# Patient Record
Sex: FEMALE | Race: WHITE | Employment: FULL TIME | URBAN - METROPOLITAN AREA
[De-identification: names, ages, dates, MRNs, and addresses within clinical notes are randomized per-mention and may not be internally consistent; named-entity substitution may affect disease eponyms.]

---

## 2019-09-03 ENCOUNTER — OFFICE VISIT (OUTPATIENT)
Dept: PRIMARY CARE CLINIC | Age: 29
End: 2019-09-03

## 2019-09-03 VITALS
HEART RATE: 89 BPM | RESPIRATION RATE: 16 BRPM | OXYGEN SATURATION: 99 % | SYSTOLIC BLOOD PRESSURE: 123 MMHG | BODY MASS INDEX: 21.73 KG/M2 | TEMPERATURE: 98.5 F | WEIGHT: 130.4 LBS | DIASTOLIC BLOOD PRESSURE: 88 MMHG | HEIGHT: 65 IN

## 2019-09-03 DIAGNOSIS — R79.89 LOW VITAMIN D LEVEL: ICD-10-CM

## 2019-09-03 DIAGNOSIS — Z01.89 ROUTINE LAB DRAW: ICD-10-CM

## 2019-09-03 DIAGNOSIS — Z91.018 MULTIPLE FOOD ALLERGIES: ICD-10-CM

## 2019-09-03 DIAGNOSIS — F32.A ANXIETY AND DEPRESSION: Primary | ICD-10-CM

## 2019-09-03 DIAGNOSIS — Z76.89 ENCOUNTER TO ESTABLISH CARE: ICD-10-CM

## 2019-09-03 DIAGNOSIS — R10.9 STOMACH PAIN: ICD-10-CM

## 2019-09-03 DIAGNOSIS — F98.8 ATTENTION DEFICIT DISORDER, UNSPECIFIED HYPERACTIVITY PRESENCE: ICD-10-CM

## 2019-09-03 DIAGNOSIS — F41.9 ANXIETY AND DEPRESSION: Primary | ICD-10-CM

## 2019-09-03 RX ORDER — LEVONORGESTREL AND ETHINYL ESTRADIOL 0.1-0.02MG
KIT ORAL
COMMUNITY
Start: 2019-08-17

## 2019-09-03 NOTE — PROGRESS NOTES
Chief Complaint   Patient presents with   [address]     patient is establishing care and having problems with abdominal pain with diarhea.  states that she has had this problem her whole life and it is getting worse over the last two years. states that she was seeing a psychiatrist at [facility] but had to stop seeing when she graduated. states that she has been trying to find one but is having difficulty.

## 2019-09-03 NOTE — PROGRESS NOTES
[facility]   Michael Lay 64[address], [address] [facility]  P: [phone]  F: [phone]      Chief Complaint   Patient presents with   [facility]     patient is establishing care and having problems with abdominal pain with diarhea.  states that she has had this problem her whole life and it is getting worse over the last two years. states that she was seeing a psychiatrist at [facility] but had to stop seeing when she graduated. states that she has been trying to find one but is having difficulty. SUBJECTIVE   Roro Donovan is a 26 y.o. female who presents to clinic for Establish Care (patient is establishing care and having problems with abdominal pain with diarhea.  states that she has had this problem her whole life and it is getting worse over the last two years. states that she was seeing a psychiatrist at [facility] but had to stop seeing when she graduated. states that she has been trying to find one but is having difficulty. ). HPI:    Eugene Roesnberg is a 27-year-old female who presents today to establish care, and for acute onset but chronic issue of epigastric stomach pain. She took Zantac and Pepto last night with some relief in pain. She denies any changes to her stool or bladder patterns. She denies any dark or bright red blood stools. She states she has multiple food allergies, but denies prior diagnosis of Crohn's or IBS. She is also requesting medication refill for anxiety, depression, and ADD. She was previously followed by a psychiatrist Capo Montejo at [facility]. She was on Effexor and Adderall but stopped medication in April due to losing health insurance. She is on sure of her prior dosing of both medications. She is currently seeing a counselor through [address] [facility]. She has her masters and computer science. There are no active problems to display for this patient.     Past Medical History:   Diagnosis Date    Depression      Past Surgical History:   Procedure Laterality Date    HX APPENDECTOMY       Social History     Socioeconomic History    Marital status: SINGLE     Spouse name: Not on file    Number of children: Not on file    Years of education: Not on file    Highest education level: Not on file   Occupational History    Not on file   Social Needs    Financial resource strain: Not on file    Food insecurity:     Worry: Not on file     Inability: Not on file    Transportation needs:     Medical: Not on file     Non-medical: Not on file   Tobacco Use    Smoking status: Never Smoker    Smokeless tobacco: Never Used   Substance and Sexual Activity    Alcohol use: Yes     Comment: rarely    Drug use: Never    Sexual activity: Yes     Partners: Female     Birth control/protection: None   Lifestyle    Physical activity:     Days per week: Not on file     Minutes per session: Not on file    Stress: Not on file   Relationships    Social connections:     Talks on phone: Not on file     Gets together: Not on file     Attends Mandaen service: Not on file     Active member of club or organization: Not on file     Attends meetings of clubs or organizations: Not on file     Relationship status: Not on file    Intimate partner violence:     Fear of current or ex partner: Not on file     Emotionally abused: Not on file     Physically abused: Not on file     Forced sexual activity: Not on file   Other Topics Concern    Not on file   Social History Narrative    Not on file     No family history on file. Allergies   Allergen Reactions    Sulfa (Sulfonamide Antibiotics) Rash       Current Outpatient Medications   Medication Sig Dispense Refill    VIENVA 0.1-20 mg-mcg tab       Cetirizine (ZYRTEC) 10 mg cap Take  by mouth. The medications were reviewed and updated in the medical record. The past medical history, past surgical history, and family history were reviewed and updated in the medical record.     REVIEW OF SYSTEMS Review of Systems   Constitutional: Negative for malaise/fatigue. HENT: Negative for congestion. Eyes: Negative for blurred vision and pain. Respiratory: Negative for cough and shortness of breath. Cardiovascular: Negative for chest pain and palpitations. Gastrointestinal: Negative for abdominal pain and heartburn. Genitourinary: Negative for frequency and urgency. Musculoskeletal: Negative for joint pain and myalgias. Neurological: Negative for dizziness, tingling, sensory change, weakness and headaches. Psychiatric/Behavioral: Negative for depression, memory loss and substance abuse. PHYSICAL EXAM     Visit Vitals  /88 (BP 1 Location: Left arm, BP Patient Position: Sitting)   Pulse 89   Temp 98.5 °F (36.9 °C) (Oral)   Resp 16   Ht 5' 5\" (1.651 m)   Wt 130 lb 6.4 oz (59.1 kg)   LMP 08/20/2019   SpO2 99%   BMI 21.70 kg/m²       Physical Exam   Constitutional: She is oriented to person, place, and time and well-developed, well-nourished, and in no distress. HENT:   Head: Normocephalic and atraumatic. Right Ear: External ear normal.   Left Ear: External ear normal.   Cardiovascular: Normal rate, regular rhythm and normal heart sounds. Pulmonary/Chest: Effort normal and breath sounds normal.   Musculoskeletal: Normal range of motion. She exhibits no edema. Neurological: She is alert and oriented to person, place, and time. Gait normal.   Skin: Skin is warm and dry. Psychiatric: Affect and judgment normal. Her mood appears anxious. She expresses no homicidal and no suicidal ideation. Nursing note and vitals reviewed. ASSESSMENT/ PLAN   Diagnoses and all orders for this visit:    1. Anxiety and depression      -Provided multiple resources today for psychiatry, has a current counselor with [facility].  -Encouraged her to send us her records from [facility], and I can restart her Effexor.     2. Attention deficit disorder, unspecified hyperactivity presence    -Patient will send us her records for ADD, and will return to office to discuss refilling. Discussed a controlled substance agreement and urine compliance screening, both of which patient agrees to.    3. Stomach pain     -Trial famotidine/Pepcid 20 mg daily for 2 weeks    4. Multiple food allergies    -Keep \"food \"journal and return to office to discuss. Has completed allergy testing previously. 5. Encounter to establish care    6. Routine lab draw  -     CBC W/O DIFF  -     METABOLIC PANEL, COMPREHENSIVE  -     LIPID PANEL  -     VITAMIN D, 25 HYDROXY  -     TSH 3RD GENERATION    Disclaimer:  Advised patient to call back or return to office if symptoms worsen/change/persist.  Discussed expected course/resolution/complications of diagnosis in detail with patient.     Medication risks/benefits/alternatives discussed with patient. Patient was given an after visit summary which includes diagnoses, current medications, & vitals.      Discussed patient instructions and advised to read to all patient instructions regarding care.      Patient expressed understanding with the diagnosis and plan. This note will not be viewable in [address]         Kayli Gonzalez NP  9/3/2019        (This document has been electronically signed)

## 2019-09-03 NOTE — PATIENT INSTRUCTIONS
Keep a \"food\" journal.     Obtain records from [facility]    Work on psychiatry appointment.

## 2019-09-11 ENCOUNTER — DOCUMENTATION ONLY (OUTPATIENT)
Dept: PRIMARY CARE CLINIC | Age: 29
End: 2019-09-11

## 2019-09-16 ENCOUNTER — OFFICE VISIT (OUTPATIENT)
Dept: PRIMARY CARE CLINIC | Age: 29
End: 2019-09-16

## 2019-09-16 ENCOUNTER — TELEPHONE (OUTPATIENT)
Dept: PRIMARY CARE CLINIC | Age: 29
End: 2019-09-16

## 2019-09-16 VITALS
RESPIRATION RATE: 16 BRPM | HEIGHT: 65 IN | BODY MASS INDEX: 22.23 KG/M2 | SYSTOLIC BLOOD PRESSURE: 104 MMHG | WEIGHT: 133.4 LBS | OXYGEN SATURATION: 99 % | TEMPERATURE: 98.2 F | HEART RATE: 79 BPM | DIASTOLIC BLOOD PRESSURE: 70 MMHG

## 2019-09-16 DIAGNOSIS — Z79.899 CONTROLLED SUBSTANCE AGREEMENT SIGNED: ICD-10-CM

## 2019-09-16 DIAGNOSIS — R10.32 LEFT LOWER QUADRANT PAIN: ICD-10-CM

## 2019-09-16 DIAGNOSIS — F32.A ANXIETY AND DEPRESSION: ICD-10-CM

## 2019-09-16 DIAGNOSIS — F98.8 ATTENTION DEFICIT DISORDER, UNSPECIFIED HYPERACTIVITY PRESENCE: Primary | ICD-10-CM

## 2019-09-16 DIAGNOSIS — R19.7 DIARRHEA, UNSPECIFIED TYPE: ICD-10-CM

## 2019-09-16 DIAGNOSIS — F41.9 ANXIETY AND DEPRESSION: ICD-10-CM

## 2019-09-16 RX ORDER — VENLAFAXINE HYDROCHLORIDE 37.5 MG/1
CAPSULE, EXTENDED RELEASE ORAL
Qty: 60 CAP | Refills: 2 | Status: SHIPPED | OUTPATIENT
Start: 2019-09-16 | End: 2019-12-09 | Stop reason: DRUGHIGH

## 2019-09-16 RX ORDER — DEXTROAMPHETAMINE SACCHARATE, AMPHETAMINE ASPARTATE MONOHYDRATE, DEXTROAMPHETAMINE SULFATE AND AMPHETAMINE SULFATE 3.75; 3.75; 3.75; 3.75 MG/1; MG/1; MG/1; MG/1
15 CAPSULE, EXTENDED RELEASE ORAL
Qty: 30 CAP | Refills: 0 | Status: SHIPPED | OUTPATIENT
Start: 2019-11-16 | End: 2020-04-14 | Stop reason: ALTCHOICE

## 2019-09-16 RX ORDER — DEXTROAMPHETAMINE SACCHARATE, AMPHETAMINE ASPARTATE MONOHYDRATE, DEXTROAMPHETAMINE SULFATE AND AMPHETAMINE SULFATE 3.75; 3.75; 3.75; 3.75 MG/1; MG/1; MG/1; MG/1
15 CAPSULE, EXTENDED RELEASE ORAL
Qty: 30 CAP | Refills: 0 | Status: SHIPPED | OUTPATIENT
Start: 2019-10-16 | End: 2020-04-14 | Stop reason: ALTCHOICE

## 2019-09-16 RX ORDER — FAMOTIDINE 20 MG/1
20 TABLET, FILM COATED ORAL 2 TIMES DAILY
COMMUNITY

## 2019-09-16 RX ORDER — DEXTROAMPHETAMINE SACCHARATE, AMPHETAMINE ASPARTATE MONOHYDRATE, DEXTROAMPHETAMINE SULFATE AND AMPHETAMINE SULFATE 3.75; 3.75; 3.75; 3.75 MG/1; MG/1; MG/1; MG/1
15 CAPSULE, EXTENDED RELEASE ORAL
Qty: 30 CAP | Refills: 0 | Status: SHIPPED | OUTPATIENT
Start: 2019-09-16 | End: 2019-12-09 | Stop reason: SDUPTHER

## 2019-09-16 NOTE — PROGRESS NOTES
Chief Complaint   Patient presents with    Follow-up     on stomach pain, medications and adhd     1. Have you been to an emergency room, urgent clinic, or hospitalized since your last visit? NO  If yes, where when, and reason for visit? 2. Have seen or consulted any other health care provider since your last visit? NO  Please include any pap smears or colon screening in this section  If yes, where when, and reason for visit? 3. Have you had any blood work or xray, including a mammogram since your last visit NO  If yes, where when, and reason for visit? 4. Are there any changes in medications including immunizations since your last visit? NO  If yes, where when, and reason for visit? 5. Would you like to speak with a health care team member about safety in your home? NO    6. Do you have an Advanced Directive/ Living Will in place?  NO  If yes, do we have a copy on file NO  If no, would you like information NO

## 2019-09-16 NOTE — LETTER
AGREEMENT for controlled medication treatment Jenn Read, have agreed to a course of treatment that includes taking controlled medication. For this treatment I designate _____________________________________ as the [facility] Provider.  The purpose of this agreement is to prevent misunderstandings about controlled medications I may be taking for treatment, and to comply with applicable laws. I understand this agreement is essential to the trust and confidence necessary in a provider-patient relationship and that the designated provider will treat me in accordance with the statements below. As part of my treatment I agree to the followin.  USE 
a. I will take the controlled medication as instructed by the designated provider and avoid improper use of controlled medications. b.   I will not share, sell or trade controlled medication with anyone as this is a criminal offense.  
c.   I will not use any illegal controlled substance, including marijuana, cocaine, etc. as this is a criminal offense. 2.  PROVIDERS 
a. I will only obtain controlled medication from the designated provider. b.   I will not attempt to obtain the same or similar controlled medications, such as opioid pain medication, stimulants, anti-anxiety or hypnotics from any other provider as this is a criminal offense. 
c.   I will inform the designated provider about all other licensed professionals providing medical care to me and authorize communication between all providers to coordinate care, particularly prescribing or dispensing of controlled medications. 3.  PHARMACY 
a.   I will only fill controlled medication prescriptions at the approved pharmacy as listed below. 4.  OFFICE VISITS 
a. I agree to attend scheduled office visit appointments. b.   I am aware my office visits may be monthly or as determined necessary by the designated provider. c.   I will communicate fully with the designated provider about the character and intensity of my symptoms, the effect of the symptoms on my daily life, and how well the controlled medication is helping to relieve the cause of my symptoms. 5.  REFILLS OR CALL-IN PRESCRIPTIONS OF CONTROLLED MEDICATIONS 
a. I agree that refills of my prescriptions for controlled medications will be made at the time of an office visit during regular office hours. No refills will be available during evenings or on weekends. Abusive or inappropriate behavior related to medication refills will not be tolerated. b.   I will not call the office repeatedly to inquire about my controlled medication. I understand that medications will be written on the due date and not before. Calling the office repeatedly will be considered harassment, and I may be discharged from the practice. c.   Controlled medications may not be called in for refill, but doing so is at the discretion of the designated provider. d.   I am aware that only I must  prescriptions for controlled medications at the office but the designated provider may allow a designee to  the prescription from the office under very specific circumstance that may develop. 6.  TOXICOLOGY SCREENING 
a. I agree to random urine toxicology screenings in order to comply with government and [address] [place] [address] regulations. I understand that I will be financially responsible for any charges incurred for the urine toxicology screening, which may not be covered by my insurance. Failure to do so will be considered non-compliance and I may be discharged. b. In some cases an oral swab or hair sample may be substituted for a urine screen. This is at the discretion of the designated provider.   I understand that I will be financially responsible for any charges incurred as well. 
c.   I understand that if the urine toxicology does not show my medications prescribed to me by the designated provider, or it shows any illegal substance or any other medications NOT prescribed by the designated providers, I may be discharged from this practice at the discretion of the designated provider and no further controlled medications will be prescribed or follow-up appointments scheduled. Also, if any illegal substances are detected on the urine toxicology, this information may be provided to local law enforcement. 7. PILL COUNTS 
a. I am aware I may be called at any time to come into the office for a count of all my remaining controlled medications in order to help the designated provider understand the rate at which I use my controlled medications and to more effectively adjust dosage. b. I agree that I will use my medication at a rate NO greater than the prescribed rate and that use of my medication at a greater rate will result in my being without medication for the period of time until next expected due date. c. I will bring in the containers with the medication prescribed by all providers, including the designated provider, to each office visit even if there is no medication remaining. All controlled medication will be in the original containers from the pharmacy for each medication. Failure to do so will be considered non-compliance and I may be discharged from the practice. 8.  LOSS OR THEFT OF MEDICATION 
a. I will safeguard my controlled medication from loss or theft. b.   Lost or stolen controlled medication may not be replaced. This includes a prescription that has not yet been filled at the pharmacy. c.   In the event my controlled medications are stolen or lost, I will notify the designated providers office immediately. If such event occurs during the night, weekend or holiday, I will leave a detailed message on the answering machine or answering service at the number listed above. d.   I will file and produce an official police report for any effort to replace controlled medications prescribed. 9.  AGENCY COLLABORATION I authorize the designated provider and the authorized pharmacy/pharmacist to cooperate fully with any city, state, or federal law enforcement agency in the investigation of any possible misuse, sale or other diversion of my controlled medication. 10.  TREATMENT I understand that if I violate any of the conditions, my controlled medication and/or treatment will be terminated. If the violation involves obtaining controlled substances and/or dangerous drugs from another source, the incident may be reported to other medical facilities and authorities, including law enforcement. In this case, the designated provider may taper off the medication over a period of several days, as necessary, to avoid withdrawal symptoms or will suggest alternate treatment facilities. Also, a drug dependence treatment program may be recommended. 11.  AGREEMENT I agree to follow these guidelines that have been fully explained to me. All of my questions and concerns regarding treatment have been adequately answered. A copy of this document has been given to me. I agree to use ______________________________ Authorized Pharmacy located at _________________________________________________________________ Telephone ________________________________for filling ALL controlled medication prescriptions. This agreement is entered into on 9/16/2019 Patient signature__________________________________________________________ Legal Guardian signature___________________________________________________ Provider signature_________________________________________________________ Witness signature_________________________________________________________

## 2019-09-16 NOTE — PROGRESS NOTES
[facility]   Michael Lay 64[address], [address] [facility]  P: [phone]  F: [phone]      Chief Complaint   Patient presents with    Follow-up     on stomach pain, medications and adhd       SUBJECTIVE   Estela Caldwell is a 26 y.o. female who presents to clinic for Follow-up (on stomach pain, medications and adhd). HPI:    Olu is a 78-year-old female who presents today for follow-up on medication refill for ADHD and anxiety and depression. She provided paperwork from Dr. Smiley Franz at [facility] which confirmed her diagnosis of ADHD predominantly inattentive type, anxiety disorder unspecified, and other depressive episodes. At  [facility] she was being prescribed Adderall 30 mg take 0.5 tablets twice a day, and venlafaxine ER 75 mg capsule once daily. It was recommended she continue to seek counseling through the [facility]. She states today that that regimen was working well for her ADHD and anxiety, and she has tried multiple different medications before arriving on Effexor and Adderall. She feels like the 15 mg Adderall was enough for her, she often did not take her second dose of the day. She denies SI or HI today. She continues to work as a [employer] company. Today, Olu presents with continued mild/intermittent left lower abdominal pain. She denies any fevers, and states she has had diarrhea for about 1 year occurring 1-2 times weekly. She denies any black or bright red blood stools. She did see her GYN last week and was told she does have ovarian cyst.  She is more worried that something is going on with her stomach, she continues to have diarrhea. She trialed Pepcid 20 mg after her last visit with me, but states things did not improve. She is requesting referral to GI today to be evaluated. There are no active problems to display for this patient.     Past Medical History:   Diagnosis Date    Depression      Past Surgical History:   Procedure Laterality Date    HX APPENDECTOMY       Social History     Socioeconomic History    Marital status: SINGLE     Spouse name: Not on file    Number of children: Not on file    Years of education: Not on file    Highest education level: Not on file   Occupational History    Not on file   Social Needs    Financial resource strain: Not on file    Food insecurity:     Worry: Not on file     Inability: Not on file    Transportation needs:     Medical: Not on file     Non-medical: Not on file   Tobacco Use    Smoking status: Never Smoker    Smokeless tobacco: Never Used   Substance and Sexual Activity    Alcohol use: Yes     Comment: rarely    Drug use: Never    Sexual activity: Yes     Partners: Female     Birth control/protection: None   Lifestyle    Physical activity:     Days per week: Not on file     Minutes per session: Not on file    Stress: Not on file   Relationships    Social connections:     Talks on phone: Not on file     Gets together: Not on file     Attends Gnosticism service: Not on file     Active member of club or organization: Not on file     Attends meetings of clubs or organizations: Not on file     Relationship status: Not on file    Intimate partner violence:     Fear of current or ex partner: Not on file     Emotionally abused: Not on file     Physically abused: Not on file     Forced sexual activity: Not on file   Other Topics Concern    Not on file   Social History Narrative    Not on file     History reviewed. No pertinent family history. Allergies   Allergen Reactions    Sulfa (Sulfonamide Antibiotics) Rash       Current Outpatient Medications   Medication Sig Dispense Refill    famotidine (PEPCID) 20 mg tablet Take 20 mg by mouth two (2) times a day.  amphetamine-dextroamphetamine XR (ADDERALL XR) 15 mg XR capsule Take 1 Cap by mouth every morning. Max Daily Amount: 15 mg. 30 Cap 0    venlafaxine-SR (EFFEXOR-XR) 37.5 mg capsule Take 1 tab for 1 week.  Take 2 pills after week 2. 60 Cap 2    [START ON 10/16/2019] amphetamine-dextroamphetamine XR (ADDERALL XR) 15 mg XR capsule Take 1 Cap by mouth every morning. Max Daily Amount: 15 mg. 30 Cap 0    [START ON 11/16/2019] amphetamine-dextroamphetamine XR (ADDERALL XR) 15 mg XR capsule Take 1 Cap by mouth every morning. Max Daily Amount: 15 mg. 30 Cap 0    VIENVA 0.1-20 mg-mcg tab       Cetirizine (ZYRTEC) 10 mg cap Take  by mouth. The medications were reviewed and updated in the medical record. The past medical history, past surgical history, and family history were reviewed and updated in the medical record. REVIEW OF SYSTEMS   Review of Systems   Constitutional: Negative for malaise/fatigue. HENT: Negative for congestion. Eyes: Negative for blurred vision and pain. Respiratory: Negative for cough and shortness of breath. Cardiovascular: Negative for chest pain and palpitations. Gastrointestinal: Positive for abdominal pain and diarrhea. Negative for blood in stool, constipation, heartburn, nausea and vomiting. Genitourinary: Negative for frequency and urgency. Musculoskeletal: Negative for joint pain and myalgias. Neurological: Negative for dizziness, tingling, sensory change, weakness and headaches. Psychiatric/Behavioral: Negative for depression, memory loss and substance abuse. PHYSICAL EXAM     Visit Vitals  /70 (BP 1 Location: Left arm, BP Patient Position: Sitting)   Pulse 79   Temp 98.2 °F (36.8 °C) (Oral)   Resp 16   Ht 5' 5\" (1.651 m)   Wt 133 lb 6.4 oz (60.5 kg)   LMP 08/20/2019   SpO2 99%   BMI 22.20 kg/m²       Physical Exam   Constitutional: She is oriented to person, place, and time and well-developed, well-nourished, and in no distress. HENT:   Head: Normocephalic and atraumatic. Right Ear: External ear normal.   Left Ear: External ear normal.   Cardiovascular: Normal rate, regular rhythm and normal heart sounds.    Pulmonary/Chest: Effort normal and breath sounds normal.   Abdominal: Soft. Normal appearance. There is no hepatosplenomegaly. There is tenderness. There is no CVA tenderness. Musculoskeletal: Normal range of motion. She exhibits no edema. Neurological: She is alert and oriented to person, place, and time. Gait normal.   Skin: Skin is warm and dry. Psychiatric: Affect and judgment normal. Her mood appears anxious. Nursing note and vitals reviewed. ASSESSMENT/ PLAN   Diagnoses and all orders for this visit:    1. Attention deficit disorder, unspecified hyperactivity presence  -     amphetamine-dextroamphetamine XR (ADDERALL XR) 15 mg XR capsule; Take 1 Cap by mouth every morning. Max Daily Amount: 15 mg.  -     amphetamine-dextroamphetamine XR (ADDERALL XR) 15 mg XR capsule; Take 1 Cap by mouth every morning. Max Daily Amount: 15 mg.  -     amphetamine-dextroamphetamine XR (ADDERALL XR) 15 mg XR capsule; Take 1 Cap by mouth every morning. Max Daily Amount: 15 mg.  -Prior records for ADHD were reviewed and will be scanned into the chart.  reviewed and no concerns. Patient signed controlled substance agreement today, and is agreeable to compliance screening at future visits. 2. Anxiety and depression  -     venlafaxine-SR (EFFEXOR-XR) 37.5 mg capsule; Take 1 tab for 1 week. Take 2 pills after week 2.  -Patient has been off Effexor, discussed today we will start with 1 tablet for 1 week and increase to 2 tablets afterwards.  -Encouraged continued counseling. 3. Controlled substance agreement signed  -     amphetamine-dextroamphetamine XR (ADDERALL XR) 15 mg XR capsule; Take 1 Cap by mouth every morning. Max Daily Amount: 15 mg.  -     amphetamine-dextroamphetamine XR (ADDERALL XR) 15 mg XR capsule; Take 1 Cap by mouth every morning. Max Daily Amount: 15 mg.  -     amphetamine-dextroamphetamine XR (ADDERALL XR) 15 mg XR capsule; Take 1 Cap by mouth every morning.  Max Daily Amount: 15 mg.    4. Diarrhea, unspecified type  -     REFERRAL TO GASTROENTEROLOGY    5. Left lower quadrant pain  -     REFERRAL TO GASTROENTEROLOGY  -Possibly ovarian cyst vs GI etiology. Trial of Pepcid without relief. Encouraged her to schedule an appointment with GI. Disclaimer:  Advised patient to call back or return to office if symptoms worsen/change/persist.  Discussed expected course/resolution/complications of diagnosis in detail with patient.     Medication risks/benefits/alternatives discussed with patient. Patient was given an after visit summary which includes diagnoses, current medications, & vitals.      Discussed patient instructions and advised to read to all patient instructions regarding care.      Patient expressed understanding with the diagnosis and plan. This note will not be viewable in [address]         Timmy Foster NP  9/16/2019        (This document has been electronically signed)

## 2019-09-17 LAB
25(OH)D3+25(OH)D2 SERPL-MCNC: 39.1 NG/ML (ref 30–100)
ALBUMIN SERPL-MCNC: 4.4 G/DL (ref 3.5–5.5)
ALBUMIN/GLOB SERPL: 1.7 {RATIO} (ref 1.2–2.2)
ALP SERPL-CCNC: 52 IU/L (ref 39–117)
ALT SERPL-CCNC: 12 IU/L (ref 0–32)
AST SERPL-CCNC: 14 IU/L (ref 0–40)
BILIRUB SERPL-MCNC: 0.4 MG/DL (ref 0–1.2)
BUN SERPL-MCNC: 13 MG/DL (ref 6–20)
BUN/CREAT SERPL: 16 (ref 9–23)
CALCIUM SERPL-MCNC: 8.9 MG/DL (ref 8.7–10.2)
CHLORIDE SERPL-SCNC: 102 MMOL/L (ref 96–106)
CHOLEST SERPL-MCNC: 143 MG/DL (ref 100–199)
CO2 SERPL-SCNC: 24 MMOL/L (ref 20–29)
CREAT SERPL-MCNC: 0.8 MG/DL (ref 0.57–1)
ERYTHROCYTE [DISTWIDTH] IN BLOOD BY AUTOMATED COUNT: 13.5 % (ref 12.3–15.4)
GLOBULIN SER CALC-MCNC: 2.6 G/DL (ref 1.5–4.5)
GLUCOSE SERPL-MCNC: 79 MG/DL (ref 65–99)
HCT VFR BLD AUTO: 38.2 % (ref 34–46.6)
HDLC SERPL-MCNC: 61 MG/DL
HGB BLD-MCNC: 12.1 G/DL (ref 11.1–15.9)
LDLC SERPL CALC-MCNC: 70 MG/DL (ref 0–99)
MCH RBC QN AUTO: 25.5 PG (ref 26.6–33)
MCHC RBC AUTO-ENTMCNC: 31.7 G/DL (ref 31.5–35.7)
MCV RBC AUTO: 81 FL (ref 79–97)
PLATELET # BLD AUTO: 287 X10E3/UL (ref 150–450)
POTASSIUM SERPL-SCNC: 4.2 MMOL/L (ref 3.5–5.2)
PROT SERPL-MCNC: 7 G/DL (ref 6–8.5)
RBC # BLD AUTO: 4.74 X10E6/UL (ref 3.77–5.28)
SODIUM SERPL-SCNC: 139 MMOL/L (ref 134–144)
TRIGL SERPL-MCNC: 62 MG/DL (ref 0–149)
TSH SERPL DL<=0.005 MIU/L-ACNC: 2.33 UIU/ML (ref 0.45–4.5)
VLDLC SERPL CALC-MCNC: 12 MG/DL (ref 5–40)
WBC # BLD AUTO: 5.5 X10E3/UL (ref 3.4–10.8)

## 2019-11-27 ENCOUNTER — OFFICE VISIT (OUTPATIENT)
Dept: PRIMARY CARE CLINIC | Age: 29
End: 2019-11-27

## 2019-11-27 VITALS
TEMPERATURE: 97.7 F | HEIGHT: 65 IN | BODY MASS INDEX: 21.16 KG/M2 | RESPIRATION RATE: 16 BRPM | SYSTOLIC BLOOD PRESSURE: 103 MMHG | WEIGHT: 127 LBS | HEART RATE: 121 BPM | DIASTOLIC BLOOD PRESSURE: 64 MMHG | OXYGEN SATURATION: 100 %

## 2019-11-27 DIAGNOSIS — H65.91 OTHER NONSUPPURATIVE OTITIS MEDIA OF RIGHT EAR, UNSPECIFIED CHRONICITY: ICD-10-CM

## 2019-11-27 DIAGNOSIS — J02.9 SORE THROAT: Primary | ICD-10-CM

## 2019-11-27 LAB
S PYO AG THROAT QL: NEGATIVE
VALID INTERNAL CONTROL?: YES

## 2019-11-27 RX ORDER — AMOXICILLIN 875 MG/1
875 TABLET, FILM COATED ORAL 2 TIMES DAILY
Qty: 20 TAB | Refills: 0 | Status: SHIPPED | OUTPATIENT
Start: 2019-11-27 | End: 2019-12-07

## 2019-11-27 NOTE — PROGRESS NOTES
[facility]   Søndjerson Lay ., [facility], [address] [facility]  P: [phone]  F: [phone]      Chief Complaint   Patient presents with    Sore Throat     sore throat for 2 wks,  nonproductive cough for 1 wk,  chest tightness since last night       SUBJECTIVE   Hazel Mccall is a 26 y.o. female who presents to clinic for Sore Throat (sore throat for 2 wks,  nonproductive cough for 1 wk,  chest tightness since last night). HPI:  Caro Martin is a 26 yr old presents with 2 weeks of cough, sore throat, and chest tightness. She also notes right-sided ear pressure. She notes her partner has been in contact with several sick coworkers with strep, and the patient is wondering if she is strep today. She has been taking over-the-counter Zyrtec, but denies any other cold medications. She does note that she has not been drinking much water over the last several days. Her heart rate is elevated in the office today at 121. She denies any fevers. There are no active problems to display for this patient.     Past Medical History:   Diagnosis Date    Depression      Past Surgical History:   Procedure Laterality Date    HX APPENDECTOMY       Social History     Socioeconomic History    Marital status: SINGLE     Spouse name: Not on file    Number of children: Not on file    Years of education: Not on file    Highest education level: Not on file   Occupational History    Not on file   Social Needs    Financial resource strain: Not on file    Food insecurity:     Worry: Not on file     Inability: Not on file    Transportation needs:     Medical: Not on file     Non-medical: Not on file   Tobacco Use    Smoking status: Never Smoker    Smokeless tobacco: Never Used   Substance and Sexual Activity    Alcohol use: Yes     Comment: rarely    Drug use: Never    Sexual activity: Yes     Partners: Female     Birth control/protection: None   Lifestyle    Physical activity:     Days per week: Not on file Minutes per session: Not on file    Stress: Not on file   Relationships    Social connections:     Talks on phone: Not on file     Gets together: Not on file     Attends Adventism service: Not on file     Active member of club or organization: Not on file     Attends meetings of clubs or organizations: Not on file     Relationship status: Not on file    Intimate partner violence:     Fear of current or ex partner: Not on file     Emotionally abused: Not on file     Physically abused: Not on file     Forced sexual activity: Not on file   Other Topics Concern    Not on file   Social History Narrative    Not on file     No family history on file. Allergies   Allergen Reactions    Ceclor [Cefaclor] Rash    Sulfa (Sulfonamide Antibiotics) Rash       Current Outpatient Medications   Medication Sig Dispense Refill    amoxicillin (AMOXIL) 875 mg tablet Take 1 Tab by mouth two (2) times a day for 10 days. 20 Tab 0    amphetamine-dextroamphetamine XR (ADDERALL XR) 15 mg XR capsule Take 1 Cap by mouth every morning. Max Daily Amount: 15 mg. 30 Cap 0    venlafaxine-SR (EFFEXOR-XR) 37.5 mg capsule Take 1 tab for 1 week. Take 2 pills after week 2. 60 Cap 2    amphetamine-dextroamphetamine XR (ADDERALL XR) 15 mg XR capsule Take 1 Cap by mouth every morning. Max Daily Amount: 15 mg. 30 Cap 0    amphetamine-dextroamphetamine XR (ADDERALL XR) 15 mg XR capsule Take 1 Cap by mouth every morning. Max Daily Amount: 15 mg. 30 Cap 0    VIENVA 0.1-20 mg-mcg tab       Cetirizine (ZYRTEC) 10 mg cap Take  by mouth.  famotidine (PEPCID) 20 mg tablet Take 20 mg by mouth two (2) times a day. The medications were reviewed and updated in the medical record. The past medical history, past surgical history, and family history were reviewed and updated in the medical record. REVIEW OF SYSTEMS   Review of Systems   Constitutional: Negative for fever and malaise/fatigue. HENT: Positive for ear pain and sore throat. Negative for congestion. Eyes: Negative for blurred vision and pain. Respiratory: Positive for cough. Negative for shortness of breath. Cardiovascular: Negative for chest pain and palpitations. Gastrointestinal: Negative for abdominal pain and heartburn. Genitourinary: Negative for frequency and urgency. Musculoskeletal: Negative for joint pain and myalgias. Neurological: Negative for dizziness, tingling, sensory change, weakness and headaches. Psychiatric/Behavioral: Negative for depression, memory loss and substance abuse. PHYSICAL EXAM     Visit Vitals  /64   Pulse (!) 121   Temp 97.7 °F (36.5 °C) (Oral)   Resp 16   Ht 5' 5\" (1.651 m)   Wt 127 lb (57.6 kg)   SpO2 100%   BMI 21.13 kg/m²       Physical Exam  Vitals signs and nursing note reviewed. HENT:      Head: Normocephalic and atraumatic. Right Ear: Hearing, ear canal and external ear normal. Tympanic membrane is erythematous and bulging. Left Ear: Hearing, tympanic membrane, ear canal and external ear normal.   Cardiovascular:      Rate and Rhythm: Regular rhythm. Tachycardia present. Heart sounds: Normal heart sounds, S1 normal and S2 normal.   Pulmonary:      Effort: Pulmonary effort is normal.      Breath sounds: Normal breath sounds. Comments: + cough during exam  Musculoskeletal: Normal range of motion. Skin:     General: Skin is warm and dry. Neurological:      Mental Status: She is alert and oriented to person, place, and time. Gait: Gait is intact. Psychiatric:         Mood and Affect: Affect normal.         Judgment: Judgment normal.       LABS/IMAGING     Results for orders placed or performed in visit on 11/27/19   AMB POC RAPID STREP A   Result Value Ref Range    VALID INTERNAL CONTROL POC Yes     Group A Strep Ag Negative Negative      ASSESSMENT/ PLAN   Diagnoses and all orders for this visit:    1. Sore throat  -     AMB POC RAPID STREP A- negative.    - Encouraged rest, increased fluids, Tylenol/Ibuprofen for pain/fever and warm salt-water gargles as tolerated. Return to office if no improvement in 4-5 days. 2. Other nonsuppurative otitis media of right ear, unspecified chronicity  -     amoxicillin (AMOXIL) 875 mg tablet; Take 1 Tab by mouth two (2) times a day for 10 days. Disclaimer:  Advised patient to call back or return to office if symptoms worsen/change/persist.  Discussed expected course/resolution/complications of diagnosis in detail with patient.     Medication risks/benefits/alternatives discussed with patient. Patient was given an after visit summary which includes diagnoses, current medications, & vitals.      Discussed patient instructions and advised to read to all patient instructions regarding care.      Patient expressed understanding with the diagnosis and plan. This note will not be viewable in [address]         Rufino Chavez NP  11/27/2019        (This document has been electronically signed)

## 2019-11-29 ENCOUNTER — TELEPHONE (OUTPATIENT)
Dept: PRIMARY CARE CLINIC | Age: 29
End: 2019-11-29

## 2019-11-29 NOTE — TELEPHONE ENCOUNTER
Confirmed patient id and she is drinking electrolytes but is having diarrhea and is going a lot even though she is eating with the medication. Advised patient to try probiotic to help replace natural maxi in digestive system and she is going to try that and see if it is better. If not will call on call doctor.

## 2019-11-29 NOTE — TELEPHONE ENCOUNTER
Patient started an antibiotic and has been having extreme diahrea and stomach pain and migraines since starting the medication.  Would like to speak to someone

## 2019-12-09 ENCOUNTER — OFFICE VISIT (OUTPATIENT)
Dept: PRIMARY CARE CLINIC | Age: 29
End: 2019-12-09

## 2019-12-09 VITALS
DIASTOLIC BLOOD PRESSURE: 78 MMHG | WEIGHT: 131.6 LBS | SYSTOLIC BLOOD PRESSURE: 109 MMHG | OXYGEN SATURATION: 99 % | RESPIRATION RATE: 16 BRPM | BODY MASS INDEX: 21.92 KG/M2 | HEART RATE: 109 BPM | HEIGHT: 65 IN | TEMPERATURE: 98.4 F

## 2019-12-09 DIAGNOSIS — Z79.899 CONTROLLED SUBSTANCE AGREEMENT SIGNED: ICD-10-CM

## 2019-12-09 DIAGNOSIS — H92.02 LEFT EAR PAIN: ICD-10-CM

## 2019-12-09 DIAGNOSIS — F98.8 ATTENTION DEFICIT DISORDER, UNSPECIFIED HYPERACTIVITY PRESENCE: Primary | ICD-10-CM

## 2019-12-09 RX ORDER — DEXTROAMPHETAMINE SACCHARATE, AMPHETAMINE ASPARTATE MONOHYDRATE, DEXTROAMPHETAMINE SULFATE AND AMPHETAMINE SULFATE 3.75; 3.75; 3.75; 3.75 MG/1; MG/1; MG/1; MG/1
15 CAPSULE, EXTENDED RELEASE ORAL
Qty: 30 CAP | Refills: 0 | Status: SHIPPED | OUTPATIENT
Start: 2020-01-19 | End: 2019-12-09 | Stop reason: ALTCHOICE

## 2019-12-09 RX ORDER — DEXTROAMPHETAMINE SACCHARATE, AMPHETAMINE ASPARTATE MONOHYDRATE, DEXTROAMPHETAMINE SULFATE AND AMPHETAMINE SULFATE 3.75; 3.75; 3.75; 3.75 MG/1; MG/1; MG/1; MG/1
15 CAPSULE, EXTENDED RELEASE ORAL
Qty: 30 CAP | Refills: 0 | Status: SHIPPED | OUTPATIENT
Start: 2020-02-18 | End: 2019-12-09 | Stop reason: ALTCHOICE

## 2019-12-09 RX ORDER — DEXTROAMPHETAMINE SACCHARATE, AMPHETAMINE ASPARTATE MONOHYDRATE, DEXTROAMPHETAMINE SULFATE AND AMPHETAMINE SULFATE 3.75; 3.75; 3.75; 3.75 MG/1; MG/1; MG/1; MG/1
15 CAPSULE, EXTENDED RELEASE ORAL
Qty: 30 CAP | Refills: 0 | Status: SHIPPED | OUTPATIENT
Start: 2020-01-19 | End: 2020-04-14 | Stop reason: ALTCHOICE

## 2019-12-09 RX ORDER — DEXTROAMPHETAMINE SACCHARATE, AMPHETAMINE ASPARTATE MONOHYDRATE, DEXTROAMPHETAMINE SULFATE AND AMPHETAMINE SULFATE 3.75; 3.75; 3.75; 3.75 MG/1; MG/1; MG/1; MG/1
15 CAPSULE, EXTENDED RELEASE ORAL
Qty: 30 CAP | Refills: 0 | Status: SHIPPED | OUTPATIENT
Start: 2019-12-20 | End: 2020-04-14 | Stop reason: SDUPTHER

## 2019-12-09 RX ORDER — DEXTROAMPHETAMINE SACCHARATE, AMPHETAMINE ASPARTATE MONOHYDRATE, DEXTROAMPHETAMINE SULFATE AND AMPHETAMINE SULFATE 3.75; 3.75; 3.75; 3.75 MG/1; MG/1; MG/1; MG/1
15 CAPSULE, EXTENDED RELEASE ORAL
Qty: 30 CAP | Refills: 0 | Status: SHIPPED | OUTPATIENT
Start: 2019-12-20 | End: 2019-12-09 | Stop reason: ALTCHOICE

## 2019-12-09 RX ORDER — DEXTROAMPHETAMINE SACCHARATE, AMPHETAMINE ASPARTATE MONOHYDRATE, DEXTROAMPHETAMINE SULFATE AND AMPHETAMINE SULFATE 3.75; 3.75; 3.75; 3.75 MG/1; MG/1; MG/1; MG/1
15 CAPSULE, EXTENDED RELEASE ORAL
Qty: 30 CAP | Refills: 0 | Status: SHIPPED | OUTPATIENT
Start: 2020-02-18 | End: 2020-04-14 | Stop reason: ALTCHOICE

## 2019-12-09 RX ORDER — VENLAFAXINE HYDROCHLORIDE 75 MG/1
75 CAPSULE, EXTENDED RELEASE ORAL DAILY
Qty: 90 CAP | Refills: 0 | Status: SHIPPED | OUTPATIENT
Start: 2019-12-09 | End: 2019-12-10 | Stop reason: SDUPTHER

## 2019-12-09 NOTE — PROGRESS NOTES
Chief Complaint   Patient presents with    Ear Pain     came in a few weeks back and it is still bothering her and wants to have her ears checked.

## 2019-12-09 NOTE — PROGRESS NOTES
[facility]   [address] Hered[address], [address] [facility]  P: [phone]  F: [phone]      Chief Complaint   Patient presents with    Ear Pain     came in a few weeks back and it is still bothering her and wants to have her ears checked. SUBJECTIVE   Tiffani Larios is a 19761 [place] y.o. female who presents to clinic for Ear Pain (came in a few weeks back and it is still bothering her and wants to have her ears checked. ). HPI:    Jocelin Nelson is a [address]81 [place] yr old who presents today for left ear check, and would like to follow-up for routine ADHD visit. She notes recent amoxicillin course for ear infection, switch to a Z-Waylon due to diarrhea with the amoxicillin. She finished her Z-Waylon roughly 4 days ago and notes some residual left ear pain. ADHD Follow-Up/ Medication Refill for Adia:     Where/ when was formal ADHD testing completed: Dr. Michael Baird  Current medication: Adderall 15 mg XR daily, Effexor 75 mg XR daily. Medication efficacy- helping with school, job, etc.: Helps with her job and with anxiety. Medication Side effects- any problems with moodiness, appetite, weight loss, or sleep? None  Controlled Substance Agreement on file: On file   Reviewed today: Yes reviewed  Urine compliance screen completed? Date: Will be ordering today. Potential Concerns: No, will need to monitor heart rate    There are no active problems to display for this patient.      Past Medical History:   Diagnosis Date    Depression      Past Surgical History:   Procedure Laterality Date    HX APPENDECTOMY       Social History     Socioeconomic History    Marital status: SINGLE     Spouse name: Not on file    Number of children: Not on file    Years of education: Not on file    Highest education level: Not on file   Occupational History    Not on file   Social Needs    Financial resource strain: Not on file    Food insecurity:     Worry: Not on file     Inability: Not on file    Transportation needs: Medical: Not on file     Non-medical: Not on file   Tobacco Use    Smoking status: Never Smoker    Smokeless tobacco: Never Used   Substance and Sexual Activity    Alcohol use: Yes     Comment: rarely    Drug use: Never    Sexual activity: Yes     Partners: Female     Birth control/protection: None   Lifestyle    Physical activity:     Days per week: Not on file     Minutes per session: Not on file    Stress: Not on file   Relationships    Social connections:     Talks on phone: Not on file     Gets together: Not on file     Attends [employer] service: Not on file     Active member of club or organization: Not on file     Attends meetings of clubs or organizations: Not on file     Relationship status: Not on file    Intimate partner violence:     Fear of current or ex partner: Not on file     Emotionally abused: Not on file     Physically abused: Not on file     Forced sexual activity: Not on file   Other Topics Concern    Not on file   Social History Narrative    Not on file     History reviewed. No pertinent family history. Allergies   Allergen Reactions    Ceclor [Cefaclor] Rash    Sulfa (Sulfonamide Antibiotics) Rash       Current Outpatient Medications   Medication Sig Dispense Refill    [START ON 12/20/2019] amphetamine-dextroamphetamine XR (ADDERALL XR) 15 mg XR capsule Take 1 Cap by mouth every morning. Max Daily Amount: 15 mg. 30 Cap 0    [START ON 1/19/2020] amphetamine-dextroamphetamine XR (ADDERALL XR) 15 mg XR capsule Take 1 Cap by mouth every morning. Max Daily Amount: 15 mg. 30 Cap 0    [START ON 2/18/2020] amphetamine-dextroamphetamine XR (ADDERALL XR) 15 mg XR capsule Take 1 Cap by mouth every morning. Max Daily Amount: 15 mg. 30 Cap 0    famotidine (PEPCID) 20 mg tablet Take 20 mg by mouth two (2) times a day.  amphetamine-dextroamphetamine XR (ADDERALL XR) 15 mg XR capsule Take 1 Cap by mouth every morning.  Max Daily Amount: 15 mg. 30 Cap 0    amphetamine-dextroamphetamine XR (ADDERALL XR) 15 mg XR capsule Take 1 Cap by mouth every morning. Max Daily Amount: 15 mg. 30 Cap 0    VIENVA 0.1-20 mg-mcg tab       Cetirizine (ZYRTEC) 10 mg cap Take  by mouth.  venlafaxine-SR (EFFEXOR-XR) 75 mg capsule Take 1 Cap by mouth daily. 90 Cap 0           The medications were reviewed and updated in the medical record. The past medical history, past surgical history, and family history were reviewed and updated in the medical record. REVIEW OF SYSTEMS   Review of Systems   Constitutional: Negative for malaise/fatigue. HENT: Positive for ear pain. Negative for congestion. Eyes: Negative for blurred vision and pain. Respiratory: Negative for cough and shortness of breath. Cardiovascular: Negative for chest pain and palpitations. Gastrointestinal: Negative for abdominal pain and heartburn. Genitourinary: Negative for frequency and urgency. Musculoskeletal: Negative for joint pain and myalgias. Neurological: Negative for dizziness, tingling, sensory change, weakness and headaches. Psychiatric/Behavioral: Negative for depression, memory loss and substance abuse. PHYSICAL EXAM     Visit Vitals  /78 (BP 1 Location: Left arm, BP Patient Position: Sitting)   Pulse (!) 109   Temp 98.4 °F (36.9 °C) (Oral)   Resp 16   Ht 5' 5\" (1.651 m)   Wt 131 lb 9.6 oz (59.7 kg)   LMP 12/05/2019   SpO2 99%   BMI 21.90 kg/m²       Physical Exam  Vitals signs and nursing note reviewed. HENT:      Head: Normocephalic and atraumatic. Right Ear: Hearing, tympanic membrane, ear canal and external ear normal.      Left Ear: Hearing, tympanic membrane, ear canal and external ear normal.   Cardiovascular:      Rate and Rhythm: Regular rhythm. Tachycardia present. Heart sounds: Normal heart sounds, S1 normal and S2 normal.   Pulmonary:      Effort: Pulmonary effort is normal.      Breath sounds: Normal breath sounds. Musculoskeletal: Normal range of motion.    Skin:     General: Skin is warm and dry. Neurological:      Mental Status: She is alert and oriented to person, place, and time. Gait: Gait is intact. Psychiatric:         Mood and Affect: Affect normal.         Judgment: Judgment normal.         ASSESSMENT/ PLAN   Diagnoses and all orders for this visit:    1. Attention deficit disorder, unspecified hyperactivity presence  -     COMPLIANCE DRUG SCREEN/PRESCRIPTION MONITORING  -     amphetamine-dextroamphetamine XR (ADDERALL XR) 15 mg XR capsule; Take 1 Cap by mouth every morning. Max Daily Amount: 15 mg.  -     amphetamine-dextroamphetamine XR (ADDERALL XR) 15 mg XR capsule; Take 1 Cap by mouth every morning. Max Daily Amount: 15 mg.  -     amphetamine-dextroamphetamine XR (ADDERALL XR) 15 mg XR capsule; Take 1 Cap by mouth every morning. Max Daily Amount: 15 mg.    2. Controlled substance agreement signed  -     COMPLIANCE DRUG SCREEN/PRESCRIPTION MONITORING    3. Left ear pain       -Continue antihistamine, recent antibiotic course. Ears improved on exam.    Disclaimer:  Advised patient to call back or return to office if symptoms worsen/change/persist.  Discussed expected course/resolution/complications of diagnosis in detail with patient.     Medication risks/benefits/alternatives discussed with patient. Patient was given an after visit summary which includes diagnoses, current medications, & vitals.      Discussed patient instructions and advised to read to all patient instructions regarding care.      Patient expressed understanding with the diagnosis and plan. This note will not be viewable in [address]         Virgil Lozano NP  12/9/2019        (This document has been electronically signed)

## 2019-12-10 DIAGNOSIS — F41.9 ANXIETY AND DEPRESSION: Primary | ICD-10-CM

## 2019-12-10 DIAGNOSIS — F32.A ANXIETY AND DEPRESSION: Primary | ICD-10-CM

## 2019-12-10 RX ORDER — VENLAFAXINE HYDROCHLORIDE 75 MG/1
75 CAPSULE, EXTENDED RELEASE ORAL DAILY
Qty: 90 CAP | Refills: 0 | Status: SHIPPED | OUTPATIENT
Start: 2019-12-10 | End: 2020-07-28

## 2020-02-24 ENCOUNTER — OFFICE VISIT (OUTPATIENT)
Dept: PRIMARY CARE CLINIC | Age: 30
End: 2020-02-24

## 2020-02-24 VITALS
RESPIRATION RATE: 16 BRPM | HEART RATE: 146 BPM | TEMPERATURE: 98.6 F | BODY MASS INDEX: 21.43 KG/M2 | HEIGHT: 65 IN | WEIGHT: 128.6 LBS | SYSTOLIC BLOOD PRESSURE: 85 MMHG | DIASTOLIC BLOOD PRESSURE: 61 MMHG | OXYGEN SATURATION: 98 %

## 2020-02-24 DIAGNOSIS — R00.0 TACHYCARDIA: ICD-10-CM

## 2020-02-24 DIAGNOSIS — R09.81 SINUS CONGESTION: Primary | ICD-10-CM

## 2020-02-24 DIAGNOSIS — H93.8X3 EAR FULLNESS, BILATERAL: ICD-10-CM

## 2020-02-24 RX ORDER — AZELASTINE 1 MG/ML
1 SPRAY, METERED NASAL 2 TIMES DAILY
Qty: 1 BOTTLE | Refills: 0 | Status: SHIPPED | OUTPATIENT
Start: 2020-02-24

## 2020-02-24 NOTE — PROGRESS NOTES
Chief Complaint   Patient presents with    Ear Pain     has been having ear pain in both ears for over a week. it is uncomfortable and she is noticed congestion  as well.

## 2020-02-24 NOTE — PROGRESS NOTES
[facility]   Michael Lay 64[address] [facility], [address] [place] [address]  P: [phone]  F: [phone]      Chief Complaint   Patient presents with    Ear Pain     has been having ear pain in both ears for over a week. it is uncomfortable and she is noticed congestion  as well. SUBJECTIVE   Donald Ivory is a 26 y.o. female who presents to clinic for Ear Pain (has been having ear pain in both ears for over a week. it is uncomfortable and she is noticed congestion  as well. ). HPI:    Michela Randall is a 26 yr old female who presents who presents today for bilateral ear fullness and sinus congestion x 6 days. She has been taking Sudafed as needed, but reports it makes her feel funny. Her blood pressure is low in office today at 85/61. She states her normal blood pressure is around 100/60. Heart rate is also elevated in office at 146, but she reports her normal heart rate is 100-110. She denies any chest pains, shortness of breath, or headaches today. She is inquiring about travel medications as she is leaving for [place]  [employer] in May with her fiancé (female). There are no active problems to display for this patient.      Past Medical History:   Diagnosis Date    Depression      Past Surgical History:   Procedure Laterality Date    HX APPENDECTOMY       Social History     Socioeconomic History    Marital status: SINGLE     Spouse name: Not on file    Number of children: Not on file    Years of education: Not on file    Highest education level: Not on file   Occupational History    Not on file   Social Needs    Financial resource strain: Not on file    Food insecurity:     Worry: Not on file     Inability: Not on file    Transportation needs:     Medical: Not on file     Non-medical: Not on file   Tobacco Use    Smoking status: Never Smoker    Smokeless tobacco: Never Used   Substance and Sexual Activity    Alcohol use: Yes     Comment: rarely    Drug use: Never    Sexual activity: Yes Partners: Female     Birth control/protection: None   Lifestyle    Physical activity:     Days per week: Not on file     Minutes per session: Not on file    Stress: Not on file   Relationships    Social connections:     Talks on phone: Not on file     Gets together: Not on file     Attends [employer] service: Not on file     Active member of club or organization: Not on file     Attends meetings of clubs or organizations: Not on file     Relationship status: Not on file    Intimate partner violence:     Fear of current or ex partner: Not on file     Emotionally abused: Not on file     Physically abused: Not on file     Forced sexual activity: Not on file   Other Topics Concern    Not on file   Social History Narrative    Not on file     History reviewed. No pertinent family history. Allergies   Allergen Reactions    Ceclor [Cefaclor] Rash    Sulfa (Sulfonamide Antibiotics) Rash       Current Outpatient Medications   Medication Sig Dispense Refill    azelastine (ASTELIN) 137 mcg (0.1 %) nasal spray 1 [place] by Both Nostrils route two (2) times a day. Use in each nostril as directed 1 Bottle 0    venlafaxine-SR (EFFEXOR-XR) 75 mg capsule Take 1 Cap by mouth daily. 90 Cap 0    amphetamine-dextroamphetamine XR (ADDERALL XR) 15 mg XR capsule Take 1 Cap by mouth every morning. Max Daily Amount: 15 mg. 30 Cap 0    amphetamine-dextroamphetamine XR (ADDERALL XR) 15 mg XR capsule Take 1 Cap by mouth every morning. Max Daily Amount: 15 mg. 30 Cap 0    amphetamine-dextroamphetamine XR (ADDERALL XR) 15 mg XR capsule Take 1 Cap by mouth every morning. Max Daily Amount: 15 mg. 30 Cap 0    famotidine (PEPCID) 20 mg tablet Take 20 mg by mouth two (2) times a day.  amphetamine-dextroamphetamine XR (ADDERALL XR) 15 mg XR capsule Take 1 Cap by mouth every morning. Max Daily Amount: 15 mg. 30 Cap 0    amphetamine-dextroamphetamine XR (ADDERALL XR) 15 mg XR capsule Take 1 Cap by mouth every morning.  Max Daily Amount: 15 mg. 30 Cap 0    VIENVA 0.1-20 mg-mcg tab       Cetirizine (ZYRTEC) 10 mg cap Take  by mouth. The medications were reviewed and updated in the medical record. The past medical history, past surgical history, and family history were reviewed and updated in the medical record. REVIEW OF SYSTEMS   Review of Systems   Constitutional: Negative for malaise/fatigue. HENT: Positive for congestion. Eyes: Negative for blurred vision and pain. Respiratory: Negative for cough and shortness of breath. Cardiovascular: Negative for chest pain and palpitations. Gastrointestinal: Negative for abdominal pain and heartburn. Genitourinary: Negative for frequency and urgency. Musculoskeletal: Negative for joint pain and myalgias. Neurological: Positive for headaches. Negative for dizziness, tingling, sensory change and weakness. Psychiatric/Behavioral: Negative for depression, memory loss and substance abuse. PHYSICAL EXAM     Visit Vitals  BP (!) 85/61 (BP 1 Location: Left arm, BP Patient Position: Sitting)   Pulse (!) 146   Temp 98.6 °F (37 °C) (Oral)   Resp 16   Ht 5' 5\" (1.651 m)   Wt 128 lb 9.6 oz (58.3 kg)   LMP 02/17/2020   SpO2 98%   BMI 21.40 kg/m²       Physical Exam  Vitals signs and nursing note reviewed. HENT:      Head: Normocephalic and atraumatic. Right Ear: Hearing, ear canal and external ear normal. Tympanic membrane is bulging. Left Ear: Hearing, tympanic membrane, ear canal and external ear normal.      Nose: Congestion present. Right Sinus: Maxillary sinus tenderness present. Left Sinus: Maxillary sinus tenderness present. Cardiovascular:      Rate and Rhythm: Regular rhythm. Tachycardia present. Heart sounds: Normal heart sounds, S1 normal and S2 normal.   Pulmonary:      Effort: Pulmonary effort is normal.      Breath sounds: Normal breath sounds. Musculoskeletal: Normal range of motion. Skin:     General: Skin is warm and dry. Neurological:      Mental Status: She is alert and oriented to person, place, and time. Gait: Gait is intact. Psychiatric:         Mood and Affect: Affect normal.         Judgment: Judgment normal.       ASSESSMENT/ PLAN   Diagnoses and all orders for this visit:    1. Sinus congestion  -     azelastine (ASTELIN) 137 mcg (0.1 %) nasal spray; 1 [place] by Both Nostrils route two (2) times a day. Use in each nostril as directed  -Sinus Treatments:  1. Nasal rinses:  Saline rinses or salt water rinses or Neti pot  2. Anti-histamines: allegra (fenofexadine) or claritn (loratidine) or zyrtec (certizine)  3. Nasal steroids: Nasacort and Flonase (are over the counter & prescription)    2. Ear fullness, bilateral       -Per above, if not improving in 4-5 days will consider antibiotics. 3. Tachycardia      -Increase hydration, her brother is a physician and she agreed to have him check her heart rate over the next week to make sure it is improving. Disclaimer:  Advised patient to call back or return to office if symptoms worsen/change/persist.  Discussed expected course/resolution/complications of diagnosis in detail with patient.     Medication risks/benefits/alternatives discussed with patient. Patient was given an after visit summary which includes diagnoses, current medications, & vitals.      Discussed patient instructions and advised to read to all patient instructions regarding care.      Patient expressed understanding with the diagnosis and plan. This note will not be viewable in [address]         Hailey Chris NP  2/24/2020        (This document has been electronically signed)

## 2020-04-14 ENCOUNTER — VIRTUAL VISIT (OUTPATIENT)
Dept: PRIMARY CARE CLINIC | Age: 30
End: 2020-04-14

## 2020-04-14 DIAGNOSIS — F41.9 ANXIETY AND DEPRESSION: ICD-10-CM

## 2020-04-14 DIAGNOSIS — M77.8 RIGHT WRIST TENDONITIS: ICD-10-CM

## 2020-04-14 DIAGNOSIS — F98.8 ATTENTION DEFICIT DISORDER, UNSPECIFIED HYPERACTIVITY PRESENCE: Primary | ICD-10-CM

## 2020-04-14 DIAGNOSIS — F32.A ANXIETY AND DEPRESSION: ICD-10-CM

## 2020-04-14 RX ORDER — DEXTROAMPHETAMINE SACCHARATE, AMPHETAMINE ASPARTATE MONOHYDRATE, DEXTROAMPHETAMINE SULFATE AND AMPHETAMINE SULFATE 3.75; 3.75; 3.75; 3.75 MG/1; MG/1; MG/1; MG/1
15 CAPSULE, EXTENDED RELEASE ORAL
Qty: 30 CAP | Refills: 0 | Status: SHIPPED | OUTPATIENT
Start: 2020-06-14 | End: 2020-08-03 | Stop reason: SDUPTHER

## 2020-04-14 RX ORDER — DEXTROAMPHETAMINE SACCHARATE, AMPHETAMINE ASPARTATE MONOHYDRATE, DEXTROAMPHETAMINE SULFATE AND AMPHETAMINE SULFATE 3.75; 3.75; 3.75; 3.75 MG/1; MG/1; MG/1; MG/1
15 CAPSULE, EXTENDED RELEASE ORAL
Qty: 30 CAP | Refills: 0 | Status: SHIPPED | OUTPATIENT
Start: 2020-05-14 | End: 2020-09-14 | Stop reason: SDUPTHER

## 2020-04-14 RX ORDER — DEXTROAMPHETAMINE SACCHARATE, AMPHETAMINE ASPARTATE MONOHYDRATE, DEXTROAMPHETAMINE SULFATE AND AMPHETAMINE SULFATE 3.75; 3.75; 3.75; 3.75 MG/1; MG/1; MG/1; MG/1
15 CAPSULE, EXTENDED RELEASE ORAL
Qty: 30 CAP | Refills: 0 | Status: SHIPPED | OUTPATIENT
Start: 2020-04-14 | End: 2020-09-14 | Stop reason: SDUPTHER

## 2020-04-14 NOTE — PATIENT INSTRUCTIONS
Wrist Tendinitis: Exercises Introduction Here are some examples of exercises for you to try. The exercises may be suggested for a condition or for rehabilitation. Start each exercise slowly. Ease off the exercises if you start to have pain. You will be told when to start these exercises and which ones will work best for you. How to do the exercises Wrist flexion and extension 1. Place your forearm on a table, with your hand and affected wrist extended beyond the table, palm down. 2. Bend your wrist to move your hand upward and allow your hand to close into a fist, then lower your hand and allow your fingers to relax. Hold each position for about 6 seconds. 3. Repeat 8 to 12 times. Hand flips 1. While seated, place your forearm and affected wrist on your thigh, palm down. 2. Flip your hand over so the back of your hand rests on your thigh and your palm is up. Alternate between palm up and palm down while keeping your forearm on your thigh. 3. Repeat 8 to 12 times. Wrist radial and ulnar deviation 1. Hold your affected hand out in front of you, palm down. 2. Slowly bend your wrist as far as you can from side to side. Hold each position for about 6 seconds. 3. Repeat 8 to 12 times. Wrist extensor stretch 1. Extend the arm with the affected wrist in front of you and point your fingers toward the floor. 2. With your other hand, gently bend your wrist farther until you feel a mild to moderate stretch in your forearm. 3. Hold the stretch for at least 15 to 30 seconds. 4. Repeat 2 to 4 times. 5. When you can do this stretch with ease and no pain, repeat steps 1 through 4. But this time extend your affected arm in front of you and make a fist with your palm facing down. Then bend your wrist, pointing your fist toward the floor. Wrist flexor stretch 1. Extend the arm with the affected wrist in front of you with your palm facing away from your body. 2. Bend back your wrist, pointing your hand up toward the ceiling. 3. With your other hand, gently bend your wrist farther until you feel a mild to moderate stretch in your forearm. 4. Hold the stretch for at least 15 to 30 seconds. 5. Repeat 2 to 4 times. 6. Repeat steps 1 through 5, but this time extend your affected arm in front of you with your palm facing up. Then bend back your wrist, pointing your hand toward the floor. Follow-up care is a key part of your treatment and safety. Be sure to make and go to all appointments, and call your doctor if you are having problems. It's also a good idea to know your test results and keep a list of the medicines you take. Where can you learn more? Go to http://michael-ariel.info/ Enter N949 in the search box to learn more about \"Wrist Tendinitis: Exercises. \" Current as of: June 26, 2019Content Version: 12.4 © 2572-2501 [employer]. Care instructions adapted under license by [employer] (which disclaims liability or warranty for this information). If you have questions about a medical condition or this instruction, always ask your healthcare professional. [address] any warranty or liability for your use of this information.

## 2020-04-14 NOTE — PROGRESS NOTES
[facility]   Michael Lay 64[address] [facility], [address] [place] [address]  P: [phone]  F: [phone]    ANGELA Fu is a 26 y.o. female who is seen over telehealth for Medication Refill (ADHD). ADHD Follow-Up/ Medication Refill for Adia:   Where/ when was formal ADHD testing completed: Dr. Pino Brochure  Current medication: Adderall 15 mg XR daily, Effexor 75 mg XR daily. Medication efficacy- helping with school, job, etc.: Helps with her job and with anxiety. Medication Side effects- any problems with moodiness, appetite, weight loss, or sleep? None  Controlled Substance Agreement on file: On file   Reviewed today: Yes reviewed  Urine compliance screen completed? Date:  Ordered at last visit, unable to complete due to current pandemic. Potential Concerns:  Continue to monitor heart rate. Ongoing right wrist pain. She saw Ortho in January and was told she had tendinitis, and was given a steroid and told to gently stretch the area. She is interested in exercises to help strengthen her wrist.    There are no active problems to display for this patient.      Past Medical History:   Diagnosis Date    Depression      Past Surgical History:   Procedure Laterality Date    HX APPENDECTOMY       Social History     Socioeconomic History    Marital status: SINGLE     Spouse name: Not on file    Number of children: Not on file    Years of education: Not on file    Highest education level: Not on file   Occupational History    Not on file   Social Needs    Financial resource strain: Not on file    Food insecurity     Worry: Not on file     Inability: Not on file    Transportation needs     Medical: Not on file     Non-medical: Not on file   Tobacco Use    Smoking status: Never Smoker    Smokeless tobacco: Never Used   Substance and Sexual Activity    Alcohol use: Yes     Comment: rarely    Drug use: Never    Sexual activity: Yes     Partners: Female     Birth control/protection: None   Lifestyle    Physical activity     Days per week: Not on file     Minutes per session: Not on file    Stress: Not on file   Relationships    Social connections     Talks on phone: Not on file     Gets together: Not on file     Attends Gnosticist service: Not on file     Active member of club or organization: Not on file     Attends meetings of clubs or organizations: Not on file     Relationship status: Not on file    Intimate partner violence     Fear of current or ex partner: Not on file     Emotionally abused: Not on file     Physically abused: Not on file     Forced sexual activity: Not on file   Other Topics Concern    Not on file   Social History Narrative    Not on file     No family history on file. Allergies   Allergen Reactions    Ceclor [Cefaclor] Rash    Sulfa (Sulfonamide Antibiotics) Rash       Current Outpatient Medications   Medication Sig Dispense Refill    [START ON 6/14/2020] amphetamine-dextroamphetamine XR (ADDERALL XR) 15 mg XR capsule Take 1 Cap by mouth every morning. Max Daily Amount: 15 mg. 30 Cap 0    [START ON 5/14/2020] amphetamine-dextroamphetamine XR (ADDERALL XR) 15 mg XR capsule Take 1 Cap by mouth every morning. Max Daily Amount: 15 mg. 30 Cap 0    amphetamine-dextroamphetamine XR (ADDERALL XR) 15 mg XR capsule Take 1 Cap by mouth every morning. Max Daily Amount: 15 mg. 30 Cap 0    azelastine (ASTELIN) 137 mcg (0.1 %) nasal spray 1 [place] by Both Nostrils route two (2) times a day. Use in each nostril as directed 1 Bottle 0    venlafaxine-SR (EFFEXOR-XR) 75 mg capsule Take 1 Cap by mouth daily. 90 Cap 0    famotidine (PEPCID) 20 mg tablet Take 20 mg by mouth two (2) times a day.  VIENVA 0.1-20 mg-mcg tab       Cetirizine (ZYRTEC) 10 mg cap Take  by mouth. The medications were reviewed and updated in the medical record. The past medical history, past surgical history, and family history were reviewed and updated in the medical record.     REVIEW OF SYSTEMS   Review of Systems   Constitutional: Negative for malaise/fatigue. HENT: Negative for congestion. Eyes: Negative for blurred vision and pain. Respiratory: Negative for cough and shortness of breath. Cardiovascular: Negative for chest pain and palpitations. Gastrointestinal: Negative for abdominal pain and heartburn. Genitourinary: Negative for frequency and urgency. Musculoskeletal: Negative for joint pain and myalgias. Neurological: Negative for dizziness, tingling, sensory change, weakness and headaches. Psychiatric/Behavioral: Negative for depression, memory loss and substance abuse. PHYSICAL EXAM   NO VITALS WERE TAKEN FOR THIS VISIT  Physical Exam  Vitals signs and nursing note reviewed. HENT:      Head: Normocephalic and atraumatic. Right Ear: External ear normal.      Left Ear: External ear normal.   Cardiovascular:      Rate and Rhythm: Normal rate and regular rhythm. Heart sounds: Normal heart sounds. Pulmonary:      Effort: Pulmonary effort is normal.      Breath sounds: Normal breath sounds. Musculoskeletal: Normal range of motion. Skin:     General: Skin is warm and dry. Neurological:      Mental Status: She is alert and oriented to person, place, and time. Gait: Gait is intact. Psychiatric:         Mood and Affect: Affect normal.         Judgment: Judgment normal.       ASSESSMENT/ PLAN   Diagnoses and all orders for this visit:    1. Attention deficit disorder, unspecified hyperactivity presence  -     amphetamine-dextroamphetamine XR (ADDERALL XR) 15 mg XR capsule; Take 1 Cap by mouth every morning. Max Daily Amount: 15 mg.  -     amphetamine-dextroamphetamine XR (ADDERALL XR) 15 mg XR capsule; Take 1 Cap by mouth every morning. Max Daily Amount: 15 mg.  -     amphetamine-dextroamphetamine XR (ADDERALL XR) 15 mg XR capsule; Take 1 Cap by mouth every morning.  Max Daily Amount: 15 mg.    2. Anxiety and depression      -On Effexor, declines further resources for me today. Reports mood is stable. 3. Right wrist tendonitis      -Try diclofenac gel, which the patient has at home. Sent stretches for wrist tendinitis through my chart to patient. I was in the office while conducting this encounter. Consent:  She and/or her healthcare decision maker is aware that this patient-initiated Telehealth encounter is a billable service, with coverage as determined by her insurance carrier. She is aware that she may receive a bill and has provided verbal consent to proceed: Yes    This virtual visit was conducted via [employer] me. Pursuant to the emergency declaration under the [facility] waiver authority and the [employer] Act, this Virtual  Visit was conducted to reduce the patient's risk of exposure to COVID-19 and provide continuity of care for an established patient. Services were provided through a video synchronous discussion virtually to substitute for in-person clinic visit. Due to this being a TeleHealth evaluation, many elements of the physical examination are unable to be assessed. Total Time: minutes: 5-10 minutes. Disclaimer:  Advised patient to call back or return to office if symptoms worsen/change/persist.  Discussed expected course/resolution/complications of diagnosis in detail with patient. Medication risks/benefits/alternatives discussed with patient. Patient was given an after visit summary which includes diagnoses, current medications, & vitals. Discussed patient instructions and advised to read to all patient instructions regarding care. Patient expressed understanding with the diagnosis and plan. This note will not be viewable in [address]         Rosalinda Carl NP  4/14/2020        (This document has been electronically signed)

## 2020-06-11 DIAGNOSIS — G43.909 MIGRAINE WITHOUT STATUS MIGRAINOSUS, NOT INTRACTABLE, UNSPECIFIED MIGRAINE TYPE: Primary | ICD-10-CM

## 2020-06-16 ENCOUNTER — VIRTUAL VISIT (OUTPATIENT)
Dept: NEUROLOGY | Age: 30
End: 2020-06-16

## 2020-06-16 DIAGNOSIS — G43.011 INTRACTABLE MIGRAINE WITHOUT AURA AND WITH STATUS MIGRAINOSUS: Primary | ICD-10-CM

## 2020-06-16 DIAGNOSIS — G47.61 PLMD (PERIODIC LIMB MOVEMENT DISORDER): ICD-10-CM

## 2020-06-16 RX ORDER — DICLOFENAC POTASSIUM 50 MG/1
50 POWDER, FOR SOLUTION ORAL AS NEEDED
Qty: 3 PACKET | Refills: 0 | Status: SHIPPED | COMMUNITY
Start: 2020-06-16 | End: 2020-08-10

## 2020-06-16 RX ORDER — TOPIRAMATE 25 MG/1
25 TABLET ORAL
Qty: 30 TAB | Refills: 5 | Status: SHIPPED | OUTPATIENT
Start: 2020-06-16

## 2020-06-16 RX ORDER — RIMEGEPANT SULFATE 75 MG/75MG
75 TABLET, ORALLY DISINTEGRATING ORAL
Qty: 2 TAB | Refills: 0 | Status: SHIPPED | COMMUNITY
Start: 2020-06-16 | End: 2020-06-16

## 2020-06-16 NOTE — PROGRESS NOTES
Consent: Marisol Ball, who was seen by synchronous (real-time) audio-video technology, and/or her healthcare decision maker, is aware that this patient-initiated, Telehealth encounter on 6/16/2020 is a billable service, with coverage as determined by her insurance carrier. She is aware that she may receive a bill and has provided verbal consent to proceed: Yes. CHIEF COMPLAINT:  This is Marisol Ball is a 26 y.o. female right handed  who had concerns including New Patient (Virtual visit--c/o 1-2 migraines a week. Patient states it is effecting her job. Was on a medication in high school that she took everyday and it worked, but she does not remember the name of it.). HPI:   Since childhood, patient have headache, L periorbital, sharp, 9/10, lasting 1-3 days, occurring 3/ week, no triggers, (+) nausea (+) vomiting (+) photophobia (-) phonophobia (-) visual disturbance    Saw a neurologist in high school and college. MRI brain in [place] was okay. Unable to recall meds used. (+) episodes of moving her legs while sleeping to the point her partner has to sleep somewhere else. Otherwise, she feels she is sleeping okay. ROS   (-) fever  (-) rash  All other systems reviewed and are negative    PMH  Past Medical History:   Diagnosis Date    Depression     Headache    Fainting spells    Social Hx  Social History     Socioeconomic History    Marital status:      Spouse name: Not on file    Number of children: Not on file    Years of education: Not on file    Highest education level: Not on file   Tobacco Use    Smoking status: Never Smoker    Smokeless tobacco: Never Used   Substance and Sexual Activity    Alcohol use: Yes     Comment: rarely    Drug use: Never    Sexual activity: Yes     Partners: Female     Birth control/protection: None       Family Hx  History reviewed. No pertinent family history.   Sister - migraine    ALLERGIES  Allergies   Allergen Reactions    Ceclor [Cefaclor] Rash    Sulfa (Sulfonamide Antibiotics) Rash       CURRENT MEDS  Current Outpatient Medications   Medication Sig Dispense Refill    topiramate (TOPAMAX) 25 mg tablet Take 1 Tab by mouth nightly. 30 Tab 5    amphetamine-dextroamphetamine XR (ADDERALL XR) 15 mg XR capsule Take 1 Cap by mouth every morning. Max Daily Amount: 15 mg. 30 Cap 0    venlafaxine-SR (EFFEXOR-XR) 75 mg capsule Take 1 Cap by mouth daily. 90 Cap 0    famotidine (PEPCID) 20 mg tablet Take 20 mg by mouth two (2) times a day.  VIENVA 0.1-20 mg-mcg tab       Cetirizine (ZYRTEC) 10 mg cap Take  by mouth.  amphetamine-dextroamphetamine XR (ADDERALL XR) 15 mg XR capsule Take 1 Cap by mouth every morning. Max Daily Amount: 15 mg. 30 Cap 0    amphetamine-dextroamphetamine XR (ADDERALL XR) 15 mg XR capsule Take 1 Cap by mouth every morning. Max Daily Amount: 15 mg. 30 Cap 0    azelastine (ASTELIN) 137 mcg (0.1 %) nasal spray 1 [place] by Both Nostrils route two (2) times a day. Use in each nostril as directed 1 Bottle 0           PREVIOUS WORKUP  IMAGING:    CT Results (recent):  No results found for this or any previous visit. MRI Results (recent):  Results from [place] encounter on 04/23/14   MRI HIP LT W CONT    Narrative **Final Report**       ICD Codes / Adm. Diagnosis: 719.45   / Pain in joint, pelvic region a    Examination:   HIP MARLEN MCLEAN LT  - [id number] - Apr 23 2014  3:00PM  Accession No:  [id number]  Reason:  Pain in joint, pelvic region and thigh      REPORT:  EXAM:  MR HIP W CON LT    INDICATION: Left hip pain for one year without trauma despite physical   therapy    COMPARISON: None    TECHNIQUE: MR arthrogram of the left hip performed after the plain   arthrographic portion of the examination utilizing axial T1 fat-saturated,   coronal T1 and T2 fat-saturated, sagittal T1 and T2 fat-saturated, and   oblique sagittal T1 fat saturated sequences  performed on the 3 Sugar magnet.     CONTRAST: intra-articular gadolinium    FINDINGS: Bone marrow: Within normal limits. No acute fracture, dislocation,   or marrow replacing process. Small bone island in the proximal left femoral   metaphysis is of no clinical significance. Articular cartilage: Intact. Acetabular labrum: No evidence of tear. Normal variant sub-labral sulcus. Hip morphology: Alpha angle: 35 degrees. Normal concavity of the femoral   head-neck junction. No acetabular overcoverage or retroversion. Tendons: Intact. Muscles: Within normal limits. Soft tissue mass: None. Intrapelvic soft tissues: No evidence for acute process. Artifact arises   from device around the cervix. IMPRESSION:  No stress fracture. No evidence of labral tear or JOSE. Signing/Reading Doctor: Michelle Miller ([id number])    Approved: Michelle Miller ([id number])  Apr 23 2014  3:45PM                                      IR Results (recent):  No results found for this or any previous visit. VAS/US Results (recent):  No results found for this or any previous visit. (I personally reviewed these images in PACS and this is my impression)    LABS (reviewed)    No visits with results within 3 Month(s) from this visit. Latest known visit with results is:   Office Visit on 11/27/2019   Component Date Value Ref Range Status    VALID INTERNAL CONTROL POC 11/27/2019 Yes   Final    Group A Strep Ag 11/27/2019 Negative  Negative Final       Objective:   Vital Signs: (As obtained by patient/caregiver at home)  There were no vitals taken for this visit.      [INSTRUCTIONS:  \"[x]\" Indicates a positive item  \"[]\" Indicates a negative item  -- DELETE ALL ITEMS NOT EXAMINED]    Constitutional: [x] Appears well-developed and well-nourished [x] No apparent distress      [] Abnormal -     Mental status: [x] Alert and awake  [x] Oriented to person/place/time [x] Able to follow commands    [] Abnormal -     Eyes:   EOM    [x]  Normal    [] Abnormal -   Sclera  [x]  Normal    [] Abnormal -          Discharge [x]  None visible   [] Abnormal -     HENT: [x] Normocephalic, atraumatic  [] Abnormal -   [x] Mouth/Throat: Mucous membranes are moist    External Ears [x] Normal  [] Abnormal -    Neck: [x] No visualized mass [] Abnormal -     Pulmonary/Chest: [x] Respiratory effort normal   [x] No visualized signs of difficulty breathing or respiratory distress        [] Abnormal -      Musculoskeletal:   [x] Normal gait with no signs of ataxia         [x] Normal range of motion of neck        [] Abnormal -     Neurological:        [x] No Facial Asymmetry (Cranial nerve 7 motor function) (limited exam due to video visit)          [x] No gaze palsy        [] Abnormal -          Skin:        [x] No significant exanthematous lesions or discoloration noted on facial skin         [] Abnormal -            Psychiatric:       [x] Normal Affect [] Abnormal -        [x] No Hallucinations    Other pertinent observable physical exam findings:-          Assessment & Plan:   JAKUB Rodriguez is a 26 y.o. female who  has a past medical history of Depression and Headache. who comes in with two concerns. Since childhood, patient have headache, L periorbital, sharp, 9/10, lasting 1-3 days, occurring 3/ week, no triggers, Excedrin sometimes help (+) nausea (+) vomiting (+) photophobia (-) phonophobia (-) visual disturbance. Saw a neurologist in high school and college. MRI brain in [address] [place] [address] was okay. Unable to recall meds used. Consideration includes migraine without auras, intractable. Second, patient will have episodes of moving her legs while sleeping to the point her partner has to sleep somewhere else. Otherwise, she feels she is sleeping okay. Consider periodic limb movement of sleep. RECOMMENDATIONS  1. I had a long discussion with patient. Discussed diagnosis, pathophysiology prognosis, available treatment and formulating plan. All questions were answered.   2. Will refer for sleep study to look for PLMs   3. Trial of Topamax 25 mg QHS as migraine prophylaxis  4. Patient to stop by our office to get samples of Cambia, Zipsor and Zomig nasal spray to try to abort headaches  5. Discussed the need for headache diary and went through the list that can trigger headache (I.e. chocolate and stress)      Follow-up and Dispositions    · Return in about 6 weeks (around 7/28/2020). Beth Zeng is a 26 y.o. female being evaluated by a video visit encounter for concerns as above. A caregiver was present when appropriate. Due to this being a TeleHealth encounter (During SKMSV-06 public health emergency), evaluation of the following organ systems was limited: Vitals/Constitutional/EENT/Resp/CV/GI//MS/Neuro/Skin/Heme-Lymph-Imm. Pursuant to the emergency declaration under the [address] [place] [address], [facility] waiver authority and the "[employer] and [employer] Act, this Virtual  Visit was conducted, with patient's (and/or legal guardian's) consent, to reduce the patient's risk of exposure to COVID-19 and provide necessary medical care. Services were provided through a video synchronous discussion virtually to substitute for in-person clinic visit. Patient and provider were located at their individual homes.         Hussein Williamson MD  Diplomate, American Board of Psychiatry and Neurology  Diplomate, Neuromuscular Medicine  Diplomate, American Board of Electrodiagnostic Medicine

## 2020-06-16 NOTE — PROGRESS NOTES
Chief Complaint   Patient presents with    New Patient     Virtual visit--c/o 1-2 migraines a week. Patient states it is effecting her job. Was on a medication in high school that she took everyday and it worked, but she does not remember the name of it.

## 2020-06-18 ENCOUNTER — TELEPHONE (OUTPATIENT)
Dept: NEUROLOGY | Age: 30
End: 2020-06-18

## 2020-06-18 NOTE — TELEPHONE ENCOUNTER
----- Message from Mona Peralta sent at 6/18/2020 12:51 PM EDT -----  Regarding: Dr. Nicole Branham Message/Vendor Calls    Caller's first and last name:  pt    Reason for call:  Question for office    Callback required yes/no and why:  yes    Best contact number(s):  [phone]    Details to clarify the request:  Pt inquiring if her sister-in-law, Alfonso Mckinnon, could come and  samples. She is willing to pick them up today.    Mona Peralta

## 2020-07-27 DIAGNOSIS — F32.A ANXIETY AND DEPRESSION: ICD-10-CM

## 2020-07-27 DIAGNOSIS — F41.9 ANXIETY AND DEPRESSION: ICD-10-CM

## 2020-07-28 RX ORDER — VENLAFAXINE HYDROCHLORIDE 75 MG/1
CAPSULE, EXTENDED RELEASE ORAL
Qty: 30 CAP | Refills: 0 | Status: SHIPPED | OUTPATIENT
Start: 2020-07-28 | End: 2020-08-03 | Stop reason: SDUPTHER

## 2020-07-30 ENCOUNTER — VIRTUAL VISIT (OUTPATIENT)
Dept: NEUROLOGY | Age: 30
End: 2020-07-30

## 2020-07-30 DIAGNOSIS — G43.011 INTRACTABLE MIGRAINE WITHOUT AURA AND WITH STATUS MIGRAINOSUS: Primary | ICD-10-CM

## 2020-07-30 RX ORDER — DICLOFENAC POTASSIUM 25 MG/1
25 CAPSULE, LIQUID FILLED ORAL AS NEEDED
Qty: 30 CAP | Refills: 1 | Status: SHIPPED | OUTPATIENT
Start: 2020-07-30 | End: 2020-08-10

## 2020-07-30 NOTE — PROGRESS NOTES
Paul Chadwick is a 31 y.o. female who was seen by synchronous (real-time) audio-video technology on 7/30/2020. Subjective:   Paul Chadwick is a  female who  has a past medical history of Depression and Headache. who comes in with two concerns. Since childhood, patient have headache, L periorbital, sharp, 9/10, lasting 1-3 days, occurring 3/ week, no triggers, Excedrin sometimes help (+) nausea (+) vomiting (+) photophobia (-) phonophobia (-) visual disturbance. Saw a neurologist in high school and college. MRI brain in [address] [place] [address] was okay. Unable to recall meds used. Consideration includes migraine without auras, intractable. Second, patient will have episodes of moving her legs while sleeping to the point her partner has to sleep somewhere else. Otherwise, she feels she is sleeping okay. Consider periodic limb movement of sleep. Patient recently diagnosed with COVID after having chest pain and muscle aches. Patient now on Topamax 25 mg QHS with still having headaches. Did not like Nurtec. Cambia was okay. Lyudmila Bonilla worked best.      ROS:  Per HPI-  Otherwise 12 point ROS was negative    Social Hx:  Social History     Socioeconomic History    Marital status:      Spouse name: Not on file    Number of children: Not on file    Years of education: Not on file    Highest education level: Not on file   Tobacco Use    Smoking status: Never Smoker    Smokeless tobacco: Never Used   Substance and Sexual Activity    Alcohol use: Yes     Comment: rarely    Drug use: Never    Sexual activity: Yes     Partners: Female     Birth control/protection: None       Meds:  Current Outpatient Medications on File Prior to Visit   Medication Sig Dispense Refill    venlafaxine-SR (EFFEXOR-XR) 75 mg capsule TAKE ONE CAPSULE BY MOUTH DAILY 30 Cap 0    topiramate (TOPAMAX) 25 mg tablet Take 1 Tab by mouth nightly.  30 Tab 5    amphetamine-dextroamphetamine XR (ADDERALL XR) 15 mg XR capsule Take 1 Cap by mouth every morning. Max Daily Amount: 15 mg. 30 Cap 0    azelastine (ASTELIN) 137 mcg (0.1 %) nasal spray 1 [place] by Both Nostrils route two (2) times a day. Use in each nostril as directed 1 Bottle 0    famotidine (PEPCID) 20 mg tablet Take 20 mg by mouth two (2) times a day.  VIENVA 0.1-20 mg-mcg tab       Cetirizine (ZYRTEC) 10 mg cap Take  by mouth.  Diclofenac Potassium (Cambia) 50 mg pwpk Take 50 mg by mouth as needed (for migraines). 3 Packet 0    amphetamine-dextroamphetamine XR (ADDERALL XR) 15 mg XR capsule Take 1 Cap by mouth every morning. Max Daily Amount: 15 mg. 30 Cap 0    amphetamine-dextroamphetamine XR (ADDERALL XR) 15 mg XR capsule Take 1 Cap by mouth every morning. Max Daily Amount: 15 mg. 30 Cap 0     No current facility-administered medications on file prior to visit. Imaging:    CT Results (recent):  No results found for this or any previous visit. MRI Results (recent):  Results from [place] encounter on 04/23/14   MRI HIP LT W CONT    Narrative **Final Report**       ICD Codes / Adm. Diagnosis: 719.45   / Pain in joint, pelvic region a    Examination:   HIP W CON LT  - [id number] - Apr 23 2014  3:00PM  Accession No:  [id number]  Reason:  Pain in joint, pelvic region and thigh      REPORT:  EXAM:  MR HIP W CON LT    INDICATION: Left hip pain for one year without trauma despite physical   therapy    COMPARISON: None    TECHNIQUE: MR arthrogram of the left hip performed after the plain   arthrographic portion of the examination utilizing axial T1 fat-saturated,   coronal T1 and T2 fat-saturated, sagittal T1 and T2 fat-saturated, and   oblique sagittal T1 fat saturated sequences  performed on the 3 Sugar magnet. CONTRAST: intra-articular gadolinium    FINDINGS: Bone marrow: Within normal limits. No acute fracture, dislocation,   or marrow replacing process. Small bone island in the proximal left femoral   metaphysis is of no clinical significance.     Articular cartilage: Intact. Acetabular labrum: No evidence of tear. Normal variant sub-labral sulcus. Hip morphology: Alpha angle: 35 degrees. Normal concavity of the femoral   head-neck junction. No acetabular overcoverage or retroversion. Tendons: Intact. Muscles: Within normal limits. Soft tissue mass: None. Intrapelvic soft tissues: No evidence for acute process. Artifact arises   from device around the cervix. IMPRESSION:  No stress fracture. No evidence of labral tear or JOSE. Signing/Reading Doctor: Kelly Zeng ([id number])    Approved: Kelly Zeng ([id number])  Apr 23 2014  3:45PM                                      IR Results (recent):  No results found for this or any previous visit. VAS/US Results (recent):  No results found for this or any previous visit. Reviewed records in [facility] and [facility] tab today    Lab Review     No visits with results within 3 Month(s) from this visit. Latest known visit with results is:   Office Visit on 11/27/2019   Component Date Value Ref Range Status    VALID INTERNAL CONTROL POC 11/27/2019 Yes   Final    Group A Strep Ag 11/27/2019 Negative  Negative Final         Objective:     General: alert, cooperative, no distress   Mental  status: mental status: alert, oriented to person, place, and time, normal mood, behavior, speech, dress, motor activity, and thought processes   Resp: resp: normal effort and no respiratory distress   Neuro: neuro: no gross deficits   Skin: skin: no discoloration or lesions of concern on visible areas     Due to this being a TeleHealth evaluation, many elements of the physical examination are unable to be assessed. Assessment:       ICD-10-CM ICD-9-CM    1. Intractable migraine without aura and with status migrainosus  G43.011 346.13 diclofenac potassium (ZIPSOR) 25 mg capsule     Possible PLMS      Plan:   1. Awiating sleep study to look for PLMs   2. Continue Topamax 25 mg QHS as migraine prophylaxis  3.  Continue Zipsor prn to abort headaches  4. Continue headache diary and list that can trigger headache (I.e. chocolate and stress)           Follow-up and Dispositions    · Return in about 6 weeks (around 9/10/2020). CPT Codes 59154-44525 for Established Patients may apply to this Telehealth Visit      We discussed the expected course, resolution and complications of the diagnosis(es) in detail. Medication risks, benefits, costs, interactions, and alternatives were discussed as indicated. I advised her to contact the office if her condition worsens, changes or fails to improve as anticipated. She expressed understanding with the diagnosis(es) and plan. Pursuant to the emergency declaration under the [facility], [facility] waiver authority and the [employer] and [employer] Act, this Virtual  Visit was conducted, with patient's consent, to reduce the patient's risk of exposure to COVID-19 and provide continuity of care for an established patient. Services were provided through a video synchronous discussion virtually to substitute for in-person clinic visit.        Dara New MD  Diplomate, American Board of Psychiatry and Neurology  Diplomate, Neuromuscular Medicine  Diplomate, American Board of Electrodiagnostic Medicine

## 2020-07-30 NOTE — PROGRESS NOTES
Chief Complaint   Patient presents with    Follow-up     Virtual visit--migraine f/u. Trial of topamax--no improvement but did have COVID! Zipsor samples did work well.

## 2020-07-31 ENCOUNTER — HOSPITAL ENCOUNTER (EMERGENCY)
Age: 30
Discharge: HOME | End: 2020-08-01
Attending: EMERGENCY MEDICINE | Admitting: EMERGENCY MEDICINE
Payer: COMMERCIAL

## 2020-07-31 ENCOUNTER — APPOINTMENT (OUTPATIENT)
Dept: GENERAL RADIOLOGY | Age: 30
End: 2020-07-31
Attending: EMERGENCY MEDICINE
Payer: COMMERCIAL

## 2020-07-31 DIAGNOSIS — R06.02 SOB (SHORTNESS OF BREATH): ICD-10-CM

## 2020-07-31 DIAGNOSIS — R07.9 CHEST PAIN, UNSPECIFIED TYPE: Primary | ICD-10-CM

## 2020-07-31 LAB
BASOPHILS # BLD: 0.1 K/UL (ref 0–0.1)
BASOPHILS NFR BLD: 1 % (ref 0–1)
COMMENT, HOLDF: NORMAL
DIFFERENTIAL METHOD BLD: NORMAL
EOSINOPHIL # BLD: 0.1 K/UL (ref 0–0.4)
EOSINOPHIL NFR BLD: 2 % (ref 0–7)
ERYTHROCYTE [DISTWIDTH] IN BLOOD BY AUTOMATED COUNT: 12.8 % (ref 11.5–14.5)
HCT VFR BLD AUTO: 41.5 % (ref 35–47)
HGB BLD-MCNC: 12.8 G/DL (ref 11.5–16)
IMM GRANULOCYTES # BLD AUTO: 0 K/UL (ref 0–0.04)
IMM GRANULOCYTES NFR BLD AUTO: 0 % (ref 0–0.5)
LYMPHOCYTES # BLD: 2.4 K/UL (ref 0.8–3.5)
LYMPHOCYTES NFR BLD: 39 % (ref 12–49)
MCH RBC QN AUTO: 26.1 PG (ref 26–34)
MCHC RBC AUTO-ENTMCNC: 30.8 G/DL (ref 30–36.5)
MCV RBC AUTO: 84.7 FL (ref 80–99)
MONOCYTES # BLD: 0.5 K/UL (ref 0–1)
MONOCYTES NFR BLD: 8 % (ref 5–13)
NEUTS SEG # BLD: 3.2 K/UL (ref 1.8–8)
NEUTS SEG NFR BLD: 50 % (ref 32–75)
NRBC # BLD: 0 K/UL (ref 0–0.01)
NRBC BLD-RTO: 0 PER 100 WBC
PLATELET # BLD AUTO: 287 K/UL (ref 150–400)
PMV BLD AUTO: 8.9 FL (ref 8.9–12.9)
RBC # BLD AUTO: 4.9 M/UL (ref 3.8–5.2)
SAMPLES BEING HELD,HOLD: NORMAL
WBC # BLD AUTO: 6.3 K/UL (ref 3.6–11)

## 2020-07-31 PROCEDURE — 85379 FIBRIN DEGRADATION QUANT: CPT

## 2020-07-31 PROCEDURE — 93005 ELECTROCARDIOGRAM TRACING: CPT

## 2020-07-31 PROCEDURE — 36415 COLL VENOUS BLD VENIPUNCTURE: CPT

## 2020-07-31 PROCEDURE — 84484 ASSAY OF TROPONIN QUANT: CPT

## 2020-07-31 PROCEDURE — 85025 COMPLETE CBC W/AUTO DIFF WBC: CPT

## 2020-07-31 PROCEDURE — 99284 EMERGENCY DEPT VISIT MOD MDM: CPT

## 2020-07-31 PROCEDURE — 71046 X-RAY EXAM CHEST 2 VIEWS: CPT

## 2020-08-01 VITALS
DIASTOLIC BLOOD PRESSURE: 83 MMHG | TEMPERATURE: 98.1 F | HEART RATE: 132 BPM | BODY MASS INDEX: 20.91 KG/M2 | SYSTOLIC BLOOD PRESSURE: 110 MMHG | OXYGEN SATURATION: 100 % | WEIGHT: 125.5 LBS | RESPIRATION RATE: 16 BRPM | HEIGHT: 65 IN

## 2020-08-01 LAB
APPEARANCE UR: CLEAR
ATRIAL RATE: 115 BPM
BACTERIA URNS QL MICRO: NEGATIVE /HPF
BILIRUB UR QL: NEGATIVE
CALCULATED P AXIS, ECG09: 79 DEGREES
CALCULATED R AXIS, ECG10: 90 DEGREES
CALCULATED T AXIS, ECG11: 10 DEGREES
COLOR UR: ABNORMAL
D DIMER PPP FEU-MCNC: 0.43 MG/L FEU (ref 0–0.65)
DIAGNOSIS, 93000: NORMAL
EPITH CASTS URNS QL MICRO: ABNORMAL /LPF
GLUCOSE UR STRIP.AUTO-MCNC: NEGATIVE MG/DL
HCG UR QL: NEGATIVE
HGB UR QL STRIP: ABNORMAL
HYALINE CASTS URNS QL MICRO: ABNORMAL /LPF (ref 0–5)
KETONES UR QL STRIP.AUTO: ABNORMAL MG/DL
LEUKOCYTE ESTERASE UR QL STRIP.AUTO: NEGATIVE
NITRITE UR QL STRIP.AUTO: NEGATIVE
P-R INTERVAL, ECG05: 112 MS
PH UR STRIP: 5.5 [PH] (ref 5–8)
PROT UR STRIP-MCNC: NEGATIVE MG/DL
Q-T INTERVAL, ECG07: 318 MS
QRS DURATION, ECG06: 80 MS
QTC CALCULATION (BEZET), ECG08: 439 MS
RBC #/AREA URNS HPF: ABNORMAL /HPF (ref 0–5)
SP GR UR REFRACTOMETRY: 1.02 (ref 1–1.03)
TROPONIN I SERPL-MCNC: <0.05 NG/ML
UR CULT HOLD, URHOLD: NORMAL
UROBILINOGEN UR QL STRIP.AUTO: 0.2 EU/DL (ref 0.2–1)
VENTRICULAR RATE, ECG03: 115 BPM
WBC URNS QL MICRO: ABNORMAL /HPF (ref 0–4)

## 2020-08-01 PROCEDURE — 81025 URINE PREGNANCY TEST: CPT

## 2020-08-01 PROCEDURE — 87635 SARS-COV-2 COVID-19 AMP PRB: CPT

## 2020-08-01 PROCEDURE — 74011250636 HC RX REV CODE- 250/636: Performed by: EMERGENCY MEDICINE

## 2020-08-01 PROCEDURE — 81001 URINALYSIS AUTO W/SCOPE: CPT

## 2020-08-01 RX ADMIN — SODIUM CHLORIDE 1000 ML: 9 INJECTION, SOLUTION INTRAVENOUS at 00:30

## 2020-08-01 NOTE — ED PROVIDER NOTES
This is a 29-year-old female with no significant past medical history that presents with a chief complaint of chest tightness and shortness of breath. She reports to me that her wife was recently exposed to coronavirus and that she has had fever, shortness of breath and cough at home. She tested negative approximately 1 week ago; although, this was a self swab test.  She denies any abdominal pain, GI or urinary symptoms. She tells me that she took her pulse at home today and found it to be as high as 170. She called her family physician who referred her to the emergency department. She is currently taking birth control but denies any history of DVT or PE. Past Medical History:   Diagnosis Date    Depression     Headache        Past Surgical History:   Procedure Laterality Date    HX APPENDECTOMY           History reviewed. No pertinent family history.     Social History     Socioeconomic History    Marital status:      Spouse name: Not on file    Number of children: Not on file    Years of education: Not on file    Highest education level: Not on file   Occupational History    Not on file   Social Needs    Financial resource strain: Not on file    Food insecurity     Worry: Not on file     Inability: Not on file    Transportation needs     Medical: Not on file     Non-medical: Not on file   Tobacco Use    Smoking status: Never Smoker    Smokeless tobacco: Never Used   Substance and Sexual Activity    Alcohol use: Yes     Comment: rarely    Drug use: Never    Sexual activity: Yes     Partners: Female     Birth control/protection: None   Lifestyle    Physical activity     Days per week: Not on file     Minutes per session: Not on file    Stress: Not on file   Relationships    Social connections     Talks on phone: Not on file     Gets together: Not on file     Attends Nondenominational service: Not on file     Active member of club or organization: Not on file     Attends meetings of clubs or organizations: Not on file     Relationship status: Not on file    Intimate partner violence     Fear of current or ex partner: Not on file     Emotionally abused: Not on file     Physically abused: Not on file     Forced sexual activity: Not on file   Other Topics Concern    Not on file   Social History Narrative    Not on file         ALLERGIES: Ceclor [cefaclor] and Sulfa (sulfonamide antibiotics)    Review of Systems   Constitutional: Positive for fever. HENT: Negative for rhinorrhea. Respiratory: Positive for shortness of breath. Cardiovascular: Positive for chest pain. Gastrointestinal: Negative for abdominal pain. Genitourinary: Negative for dysuria. Musculoskeletal: Negative for back pain. Skin: Negative for wound. Neurological: Negative for headaches. Psychiatric/Behavioral: Negative for confusion. Vitals:    07/31/20 2246   BP: 130/88   Pulse: (!) 132   Resp: 16   Temp: 98.1 °F (36.7 °C)   SpO2: 98%   Weight: 56.9 kg (125 lb 8 oz)   Height: 5' 5\" (1.651 m)            Physical Exam  Vitals signs and nursing note reviewed. Constitutional:       Appearance: Normal appearance. HENT:      Head: Normocephalic and atraumatic. Eyes:      Extraocular Movements: Extraocular movements intact. Neck:      Musculoskeletal: Normal range of motion. Cardiovascular:      Rate and Rhythm: Regular rhythm. Tachycardia present. Pulses: Normal pulses. Heart sounds: Normal heart sounds. Pulmonary:      Effort: Pulmonary effort is normal. No respiratory distress. Breath sounds: Normal breath sounds. No wheezing. Abdominal:      General: There is no distension. Musculoskeletal: Normal range of motion. Skin:     General: Skin is warm and dry. Neurological:      Mental Status: She is alert and oriented to person, place, and time.    Psychiatric:         Mood and Affect: Mood normal.          MDM  Number of Diagnoses or Management Options  Chest pain, unspecified type:   SOB (shortness of breath):   Diagnosis management comments: EKG shows sinus tachycardia rate of 115, normal intervals, normal axis, no evidence of active ischemia. Patient seen evaluated by myself. She presents for evaluation of shortness of breath and pain. She has tachycardic but has a clear lungs. She is on birth control and DVT/PE is certainly a possibility. D-dimer was obtained and the patient is low risk by Wells criteria. EKG shows sinus tachycardia with no ischemic changes. Laboratory studies including troponin and d-dimer are unremarkable. The patient was hydrated with IV fluids and her tachycardia resolved. Her chest x-ray is unremarkable. After discussion with the patient, I do believe that she is stable for discharge. My clinical impressions were discussed, we reviewed test results. The patient is comfortable and agreeable with the plan of care and aware of her return precautions. Her coronavirus test is pending.          Procedures

## 2020-08-01 NOTE — ED TRIAGE NOTES
Patient arrives to the ED with c/o SOB, chest pain ans increased HR, x 1 day, no nausea or vomiting, patient states that her wife was exposed to covid-19, but had a negative test 2 weeks ago. Patient states HR of 170 @ 1600 today.

## 2020-08-02 LAB
SARS-COV-2, COV2NT: NOT DETECTED
SOURCE, COVRS: NORMAL
SPECIMEN SOURCE, FCOV2M: NORMAL

## 2020-08-03 ENCOUNTER — VIRTUAL VISIT (OUTPATIENT)
Dept: PRIMARY CARE CLINIC | Age: 30
End: 2020-08-03
Payer: COMMERCIAL

## 2020-08-03 ENCOUNTER — PATIENT OUTREACH (OUTPATIENT)
Dept: CASE MANAGEMENT | Age: 30
End: 2020-08-03

## 2020-08-03 DIAGNOSIS — F32.A ANXIETY AND DEPRESSION: Primary | ICD-10-CM

## 2020-08-03 DIAGNOSIS — R00.0 TACHYCARDIA: ICD-10-CM

## 2020-08-03 DIAGNOSIS — F98.8 ATTENTION DEFICIT DISORDER, UNSPECIFIED HYPERACTIVITY PRESENCE: ICD-10-CM

## 2020-08-03 DIAGNOSIS — F41.9 ANXIETY AND DEPRESSION: Primary | ICD-10-CM

## 2020-08-03 PROCEDURE — 99214 OFFICE O/P EST MOD 30 MIN: CPT | Performed by: NURSE PRACTITIONER

## 2020-08-03 RX ORDER — VENLAFAXINE HYDROCHLORIDE 75 MG/1
CAPSULE, EXTENDED RELEASE ORAL
Qty: 30 CAP | Refills: 0 | Status: SHIPPED | OUTPATIENT
Start: 2020-08-03 | End: 2020-09-22 | Stop reason: SDUPTHER

## 2020-08-03 RX ORDER — DEXTROAMPHETAMINE SACCHARATE, AMPHETAMINE ASPARTATE MONOHYDRATE, DEXTROAMPHETAMINE SULFATE AND AMPHETAMINE SULFATE 3.75; 3.75; 3.75; 3.75 MG/1; MG/1; MG/1; MG/1
15 CAPSULE, EXTENDED RELEASE ORAL
Qty: 30 CAP | Refills: 0 | Status: SHIPPED | OUTPATIENT
Start: 2020-08-03 | End: 2020-09-14 | Stop reason: SDUPTHER

## 2020-08-03 NOTE — PROGRESS NOTES
[facility]   Michael Lay 64[address], [address] [facility]  P: [phone]  F: [phone]    ANGELA Jalloh is a 31 y.o. female who is seen over telehealth for Follow-up (ER) and Rapid Heart Rate. She states today over telehealth, that she believes she and her wife were sick last week. They both tested negative for COVID twice, but had fatigue, low-grade fevers, and her wife had upper respiratory symptoms. She went to the ER last week because her heart rate was elevated from the 140s170s. She did have mild chest discomfort at that time. ER work-up included negative d-dimer and negative troponin. The patient states today that her baseline heart rate has been ranging 100120s prior to this illness. She denies any cardiac dysfunction that she is aware of. She denies any current chest discomfort. Of note, the patient does have a history of ADHD and is on Adderall XR 15 mg daily. She is requesting a refill today, and would also like to follow-up with cardiology with her recent elevated heart rate and wished to continue Adderall. She is also requesting a refill of Effexor, which she takes for anxiety and depression. She is requesting a referral today to behavioral health to further discuss med options. There are no active problems to display for this patient.      Past Medical History:   Diagnosis Date    Depression     Headache      Past Surgical History:   Procedure Laterality Date    HX APPENDECTOMY       Social History     Socioeconomic History    Marital status:      Spouse name: Not on file    Number of children: Not on file    Years of education: Not on file    Highest education level: Not on file   Occupational History    Not on file   Social Needs    Financial resource strain: Not on file    Food insecurity     Worry: Not on file     Inability: Not on file    Transportation needs     Medical: Not on file     Non-medical: Not on file   Tobacco Use  Smoking status: Never Smoker    Smokeless tobacco: Never Used   Substance and Sexual Activity    Alcohol use: Yes     Comment: rarely    Drug use: Never    Sexual activity: Yes     Partners: Female     Birth control/protection: None   Lifestyle    Physical activity     Days per week: Not on file     Minutes per session: Not on file    Stress: Not on file   Relationships    Social connections     Talks on phone: Not on file     Gets together: Not on file     Attends [employer] service: Not on file     Active member of club or organization: Not on file     Attends meetings of clubs or organizations: Not on file     Relationship status: Not on file    Intimate partner violence     Fear of current or ex partner: Not on file     Emotionally abused: Not on file     Physically abused: Not on file     Forced sexual activity: Not on file   Other Topics Concern    Not on file   Social History Narrative    Not on file     No family history on file. Allergies   Allergen Reactions    Ceclor [Cefaclor] Rash    Sulfa (Sulfonamide Antibiotics) Rash       Current Outpatient Medications   Medication Sig Dispense Refill    amphetamine-dextroamphetamine XR (ADDERALL XR) 15 mg XR capsule Take 1 Cap by mouth every morning. Max Daily Amount: 15 mg. 30 Cap 0    venlafaxine-SR (EFFEXOR-XR) 75 mg capsule TAKE ONE CAPSULE BY MOUTH DAILY 30 Cap 0    diclofenac potassium (ZIPSOR) 25 mg capsule Take 1 Cap by mouth as needed (migraine). May take another capsule 2 hours later, if headache persists. Max dose 2/day. 30 Cap 1    topiramate (TOPAMAX) 25 mg tablet Take 1 Tab by mouth nightly. 30 Tab 5    Diclofenac Potassium ([place]) 50 mg pwpk Take 50 mg by mouth as needed (for migraines). 3 Packet 0    amphetamine-dextroamphetamine XR (ADDERALL XR) 15 mg XR capsule Take 1 Cap by mouth every morning.  Max Daily Amount: 15 mg. 30 Cap 0    amphetamine-dextroamphetamine XR (ADDERALL XR) 15 mg XR capsule Take 1 Cap by mouth every morning. Max Daily Amount: 15 mg. 30 Cap 0    azelastine (ASTELIN) 137 mcg (0.1 %) nasal spray 1 [place] by Both Nostrils route two (2) times a day. Use in each nostril as directed 1 Bottle 0    famotidine (PEPCID) 20 mg tablet Take 20 mg by mouth two (2) times a day.  VIENVA 0.1-20 mg-mcg tab       Cetirizine (ZYRTEC) 10 mg cap Take  by mouth. The medications were reviewed and updated in the medical record. The past medical history, past surgical history, and family history were reviewed and updated in the medical record. REVIEW OF SYSTEMS   Review of Systems   Constitutional: Negative for malaise/fatigue. HENT: Negative for congestion. Eyes: Negative for blurred vision and pain. Respiratory: Negative for cough and shortness of breath. Cardiovascular: Negative for chest pain and palpitations. Gastrointestinal: Negative for abdominal pain and heartburn. Genitourinary: Negative for frequency and urgency. Musculoskeletal: Negative for joint pain and myalgias. Neurological: Negative for dizziness, tingling, sensory change, weakness and headaches. Psychiatric/Behavioral: Negative for depression, memory loss and substance abuse. PHYSICAL EXAM   NO VITALS WERE TAKEN FOR THIS VISIT  Physical Exam  Constitutional:       Appearance: Normal appearance. HENT:      Head: Normocephalic and atraumatic. Right Ear: External ear normal.      Left Ear: External ear normal.   Eyes:      Conjunctiva/sclera: Conjunctivae normal.   Pulmonary:      Effort: Pulmonary effort is normal.   Neurological:      Mental Status: She is alert and oriented to person, place, and time. Mental status is at baseline. Psychiatric:         Speech: Speech normal.         Behavior: Behavior normal. Behavior is cooperative. Thought Content: Thought content normal.       ASSESSMENT/ PLAN   Diagnoses and all orders for this visit:    1.  Anxiety and depression  -     REFERRAL TO BEHAVIORAL HEALTH  - venlafaxine-SR (EFFEXOR-XR) 75 mg capsule; TAKE ONE CAPSULE BY MOUTH DAILY    2. Attention deficit disorder, unspecified hyperactivity presence  -     amphetamine-dextroamphetamine XR (ADDERALL XR) 15 mg XR capsule; Take 1 Cap by mouth every morning. Max Daily Amount: 15 mg.  -Discussed today I would like cardiology to clear her for long-term Adderall use, given recent tachycardia. We discussed that she may need an echocardiogram.    3. Tachycardia  -     REFERRAL TO CARDIOLOGY    I was in the office while conducting this encounter. Consent:  She and/or her healthcare decision maker is aware that this patient-initiated Telehealth encounter is a billable service, with coverage as determined by her insurance carrier. She is aware that she may receive a bill and has provided verbal consent to proceed: Yes    This virtual visit was conducted via [employer] but completed over the telephone due to Internet connection issue. Pursuant to the emergency declaration under the [facility], [facility] waiver authority and the [employer] Act, this Virtual  Visit was conducted to reduce the patient's risk of exposure to COVID-19 and provide continuity of care for an established patient. Services were provided through a video synchronous discussion virtually to substitute for in-person clinic visit. Due to this being a TeleHealth evaluation, many elements of the physical examination are unable to be assessed. Total Time: minutes: 21-30 minutes. Disclaimer:  Advised patient to call back or return to office if symptoms worsen/change/persist.  Discussed expected course/resolution/complications of diagnosis in detail with patient. Medication risks/benefits/alternatives discussed with patient. Patient was given an after visit summary which includes diagnoses, current medications, & vitals.       Discussed patient instructions and advised to read to all patient instructions regarding care. Patient expressed understanding with the diagnosis and plan. This note will not be viewable in [address]         Ike Navarro NP  8/3/2020        (This document has been electronically signed)

## 2020-08-03 NOTE — PROGRESS NOTES
Patient contacted regarding recent discharge and COVID-19 risk. Discussed COVID-19 related testing which was available at this time. Test results were negative. Patient informed of results, if available? yes    Care Transition Nurse/ Ambulatory Care Manager contacted the patient by telephone to perform post discharge assessment. Verified name and  with patient as identifiers. Patient has following risk factors of: none known. CTN/ACM reviewed discharge instructions, medical action plan and red flags related to discharge diagnosis. Reviewed and educated them on any new and changed medications related to discharge diagnosis. Advised obtaining a 90-day supply of all daily and as-needed medications. Advance Care Planning:   Does patient have an Advance Directive: decision makers updated     Education provided regarding infection prevention, and signs and symptoms of COVID-19 and when to seek medical attention with patient who verbalized understanding. Discussed exposure protocols and quarantine from [address] you at higher risk for severe illness  and given an opportunity for questions and concerns. The patient agrees to contact the COVID-19 hotline [phone] or PCP office for questions related to their healthcare. CTN/ACM provided contact information for future reference. From CDC: Are you at higher risk for severe illness?  Wash your hands often.  Avoid close contact (6 feet, which is about two arm lengths) with people who are sick.  Put distance between yourself and other people if COVID-19 is spreading in your community.  Clean and disinfect frequently touched surfaces.  Avoid all cruise travel and non-essential air travel.  Call your healthcare professional if you have concerns about COVID-19 and your underlying condition or if you are sick.     For more information on steps you can take to protect yourself, see CDC's How to Protect Yourself Patient/family/caregiver given information for [employer] and agrees to enroll yes  Patient's preferred e-mail: [email or web address]    Patient's preferred phone number: [phone]   Based on Loop alert triggers, patient will be contacted by nurse care manager for worsening symptoms. Pt will be further monitored by COVID Loop Team based on severity of symptoms and risk factors.    Provider  Department  Encounter #  Center    8/19/2020 8:20 AM  Landy Arroyo MD  [facility] [place] [facility]  [phone]

## 2020-08-04 NOTE — PROGRESS NOTES
Patient is currently enrolled in a remote symptom monitoring program.  Start day 8.5.2020;  End Date 8.20.2020

## 2020-08-10 DIAGNOSIS — G43.011 INTRACTABLE MIGRAINE WITHOUT AURA AND WITH STATUS MIGRAINOSUS: ICD-10-CM

## 2020-08-10 RX ORDER — DICLOFENAC POTASSIUM 25 MG/1
CAPSULE, LIQUID FILLED ORAL
Qty: 30 CAP | Refills: 1 | Status: SHIPPED | OUTPATIENT
Start: 2020-08-10

## 2020-08-18 NOTE — PROGRESS NOTES
[place]: [place]  [phone]    History of Present Illness:  Ms. Tl Marcum is a 30 yo F with a history of ADHD referred for cardiac evaluation. She is here due to various symptoms including chest discomfort, palpitations. A few weeks ago, she and her wife were sick and they felt they may have Coronavirus. She was having symptoms of chest \"tightness\" that would last for minutes to hours at a time along with noting that her heart rate was very elevated. Sometimes at rest, she noted it would be in the 170s and 140s bpm.  Sometimes this would be more so in the evening, but otherwise no particular triggers. She went to the emergency room and workup there was overall unrevealing. With regard to her chest tightness, this was improved over the last few weeks. Although she tested negative for COVID, they self-quarantined for the last several weeks. The chest tightness does get better actually with walking. She has been more initially easier short of breath, but this has also gotten better. She mowed the lawn yesterday and felt okay doing this. She does have occasional episodes of lightheadedness and can sometimes pass out if she stands up too quickly. She is compensated on exam with clear lungs and no lower extremity edema. Her EKG from 07/31/2020 I reviewed personally and it was sinus tachycardia, heart rate of 114. Soc hx. No tobacco use  Fam hx. No early CAD  Assessment and Plan:    1. Palpitations, tachycardia. Will obtain a 24 hour Holter and echocardiogram for further evaluation. It is possible that the sinus tachycardia was just reactive due to her viral symptoms. She did test negative for COVID. 2. Chest pain. Non cardiac. Atypical and actually improved with exertion. She does not have traditional risk factors for cardiac disease. 3. ADHD. She  has a past medical history of Depression and Headache. All other systems negative except as above.      PE  There were no vitals filed for this visit. There is no height or weight on file to calculate BMI. General appearance - alert, well appearing, and in no distress  Mental status - affect appropriate to mood  Eyes - sclera anicteric, moist mucous membranes  Neck - supple, no JVD  Chest - clear to auscultation, no wheezes, rales or rhonchi  Heart - normal rate, regular rhythm, normal S1, S2, no murmurs, rubs, clicks or gallops  Abdomen - soft, nontender, nondistended, no masses or organomegaly  Neurological - no focal deficit  Extremities - peripheral pulses normal, no pedal edema      Recent Labs:  Lab Results   Component Value Date/Time    Cholesterol, total 143 09/16/2019 08:43 AM    HDL Cholesterol 61 09/16/2019 08:43 AM    LDL, calculated 70 09/16/2019 08:43 AM    Triglyceride 62 09/16/2019 08:43 AM     Lab Results   Component Value Date/Time    Creatinine 0.80 09/16/2019 08:43 AM     Lab Results   Component Value Date/Time    BUN 13 09/16/2019 08:43 AM     Lab Results   Component Value Date/Time    Potassium 4.2 09/16/2019 08:43 AM     No results found for: HBA1C, HGBE8, THB5CCYK  Lab Results   Component Value Date/Time    HGB 12.8 07/31/2020 11:10 PM     Lab Results   Component Value Date/Time    PLATELET 562 20/38/5035 11:10 PM       Reviewed:  Past Medical History:   Diagnosis Date    Depression     Headache      Social History     Tobacco Use   Smoking Status Never Smoker   Smokeless Tobacco Never Used     Social History     Substance and Sexual Activity   Alcohol Use Yes    Comment: rarely     Allergies   Allergen Reactions    Ceclor [Cefaclor] Rash    Sulfa (Sulfonamide Antibiotics) Rash       Current Outpatient Medications   Medication Sig    Zipsor 25 mg capsule TAKE 1 CAP BY MOUTH AS NEEDED (MIGRAINE). MAY TAKE ANOTHER CAPSULE 2 HOURS LATER, IF HEADACHE PERSISTS. MAX DOSE 2/DAY.  amphetamine-dextroamphetamine XR (ADDERALL XR) 15 mg XR capsule Take 1 Cap by mouth every morning.  Max Daily Amount: 15 mg.   [facility] venlafaxine-SR (EFFEXOR-XR) 75 mg capsule TAKE ONE CAPSULE BY MOUTH DAILY    topiramate (TOPAMAX) 25 mg tablet Take 1 Tab by mouth nightly.  amphetamine-dextroamphetamine XR (ADDERALL XR) 15 mg XR capsule Take 1 Cap by mouth every morning. Max Daily Amount: 15 mg.    amphetamine-dextroamphetamine XR (ADDERALL XR) 15 mg XR capsule Take 1 Cap by mouth every morning. Max Daily Amount: 15 mg.    azelastine (ASTELIN) 137 mcg (0.1 %) nasal spray 1 [place] by Both Nostrils route two (2) times a day. Use in each nostril as directed    famotidine (PEPCID) 20 mg tablet Take 20 mg by mouth two (2) times a day.  VIENVA 0.1-20 mg-mcg tab     Cetirizine (ZYRTEC) 10 mg cap Take  by mouth. No current facility-administered medications for this visit.         Rosemary Ware MD  [facility] [place]  [address] [facility] [address]  [address]

## 2020-08-19 ENCOUNTER — OFFICE VISIT (OUTPATIENT)
Dept: CARDIOLOGY CLINIC | Age: 30
End: 2020-08-19
Payer: COMMERCIAL

## 2020-08-19 VITALS
SYSTOLIC BLOOD PRESSURE: 110 MMHG | RESPIRATION RATE: 18 BRPM | OXYGEN SATURATION: 99 % | HEART RATE: 79 BPM | BODY MASS INDEX: 22.82 KG/M2 | WEIGHT: 137 LBS | HEIGHT: 65 IN | DIASTOLIC BLOOD PRESSURE: 70 MMHG

## 2020-08-19 DIAGNOSIS — R00.0 TACHYCARDIA: Primary | ICD-10-CM

## 2020-08-19 DIAGNOSIS — R00.0 TACHYCARDIA: ICD-10-CM

## 2020-08-19 DIAGNOSIS — R00.2 HEART PALPITATIONS: ICD-10-CM

## 2020-08-19 DIAGNOSIS — F90.8 ATTENTION DEFICIT HYPERACTIVITY DISORDER (ADHD), OTHER TYPE: ICD-10-CM

## 2020-08-19 DIAGNOSIS — R07.89 OTHER CHEST PAIN: ICD-10-CM

## 2020-08-19 PROCEDURE — 99244 OFF/OP CNSLTJ NEW/EST MOD 40: CPT | Performed by: INTERNAL MEDICINE

## 2020-09-03 ENCOUNTER — VIRTUAL VISIT (OUTPATIENT)
Dept: PRIMARY CARE CLINIC | Age: 30
End: 2020-09-03
Payer: COMMERCIAL

## 2020-09-03 DIAGNOSIS — R00.0 TACHYCARDIA: Primary | ICD-10-CM

## 2020-09-03 DIAGNOSIS — F98.8 ATTENTION DEFICIT DISORDER, UNSPECIFIED HYPERACTIVITY PRESENCE: ICD-10-CM

## 2020-09-03 PROCEDURE — 99212 OFFICE O/P EST SF 10 MIN: CPT | Performed by: NURSE PRACTITIONER

## 2020-09-03 NOTE — PROGRESS NOTES
[facility]   Michael Lay 64[address], [address] [facility]  P: [phone]  F: [phone]    ANGELA Mata is a 31 y.o. female who is seen over telehealth for Medication Evaluation to discuss her Adderall, currently on hold pending evaluation with echocardiogram and Holter monitor ordered for persistent tachycardia and recent viral illness with chest discomfort. She states today that she is seeing Dr. Kimberley Kaur tomorrow 9/4/2020 and would like to consider restarting her Adderall. There are no active problems to display for this patient.      Past Medical History:   Diagnosis Date    Depression     Headache      Past Surgical History:   Procedure Laterality Date    HX APPENDECTOMY       Social History     Socioeconomic History    Marital status:      Spouse name: Not on file    Number of children: Not on file    Years of education: Not on file    Highest education level: Not on file   Occupational History    Not on file   Social Needs    Financial resource strain: Not on file    Food insecurity     Worry: Not on file     Inability: Not on file    Transportation needs     Medical: Not on file     Non-medical: Not on file   Tobacco Use    Smoking status: Never Smoker    Smokeless tobacco: Never Used   Substance and Sexual Activity    Alcohol use: Yes     Comment: rarely    Drug use: Never    Sexual activity: Yes     Partners: Female     Birth control/protection: None   Lifestyle    Physical activity     Days per week: Not on file     Minutes per session: Not on file    Stress: Not on file   Relationships    Social connections     Talks on phone: Not on file     Gets together: Not on file     Attends [employer] service: Not on file     Active member of club or organization: Not on file     Attends meetings of clubs or organizations: Not on file     Relationship status: Not on file    Intimate partner violence     Fear of current or ex partner: Not on file Emotionally abused: Not on file     Physically abused: Not on file     Forced sexual activity: Not on file   Other Topics Concern    Not on file   Social History Narrative    Not on file     No family history on file. Allergies   Allergen Reactions    Ceclor [Cefaclor] Rash    Sulfa (Sulfonamide Antibiotics) Rash       Current Outpatient Medications   Medication Sig Dispense Refill    Zipsor 25 mg capsule TAKE 1 CAP BY MOUTH AS NEEDED (MIGRAINE). MAY TAKE ANOTHER CAPSULE 2 HOURS LATER, IF HEADACHE PERSISTS. MAX DOSE 2/DAY. 30 Cap 1    amphetamine-dextroamphetamine XR (ADDERALL XR) 15 mg XR capsule Take 1 Cap by mouth every morning. Max Daily Amount: 15 mg. 30 Cap 0    venlafaxine-SR (EFFEXOR-XR) 75 mg capsule TAKE ONE CAPSULE BY MOUTH DAILY 30 Cap 0    topiramate (TOPAMAX) 25 mg tablet Take 1 Tab by mouth nightly. 30 Tab 5    amphetamine-dextroamphetamine XR (ADDERALL XR) 15 mg XR capsule Take 1 Cap by mouth every morning. Max Daily Amount: 15 mg. 30 Cap 0    amphetamine-dextroamphetamine XR (ADDERALL XR) 15 mg XR capsule Take 1 Cap by mouth every morning. Max Daily Amount: 15 mg. 30 Cap 0    azelastine (ASTELIN) 137 mcg (0.1 %) nasal spray 1 [place] by Both Nostrils route two (2) times a day. Use in each nostril as directed 1 Bottle 0    famotidine (PEPCID) 20 mg tablet Take 20 mg by mouth two (2) times a day.  VIENVA 0.1-20 mg-mcg tab       Cetirizine (ZYRTEC) 10 mg cap Take  by mouth. The medications were reviewed and updated in the medical record. The past medical history, past surgical history, and family history were reviewed and updated in the medical record. REVIEW OF SYSTEMS   Review of Systems   Constitutional: Negative for malaise/fatigue. HENT: Negative for congestion. Eyes: Negative for blurred vision and pain. Respiratory: Negative for cough and shortness of breath. Cardiovascular: Negative for chest pain and palpitations.    Gastrointestinal: Negative for abdominal pain and heartburn. Genitourinary: Negative for frequency and urgency. Musculoskeletal: Negative for joint pain and myalgias. Neurological: Negative for dizziness, tingling, sensory change, weakness and headaches. Psychiatric/Behavioral: Negative for depression, memory loss and substance abuse. PHYSICAL EXAM   NO VITALS WERE TAKEN FOR THIS VISIT  Physical Exam  Constitutional:       Appearance: Normal appearance. HENT:      Head: Normocephalic and atraumatic. Right Ear: External ear normal.      Left Ear: External ear normal.   Eyes:      Conjunctiva/sclera: Conjunctivae normal.   Pulmonary:      Effort: Pulmonary effort is normal.   Neurological:      Mental Status: She is alert and oriented to person, place, and time. Mental status is at baseline. Psychiatric:         Speech: Speech normal.         Behavior: Behavior normal. Behavior is cooperative. Thought Content: Thought content normal.       ASSESSMENT/ PLAN   Diagnoses and all orders for this visit:    1. Tachycardia       -Discussed if she has a normal Holter and echocardiogram I am comfortable restarting her Adderall. She is going to my chart message me and I told her I would send her refills in.    2. Attention deficit disorder, unspecified hyperactivity presence     -Per above. I was in the office while conducting this encounter. Consent:  She and/or her healthcare decision maker is aware that this patient-initiated Telehealth encounter is a billable service, with coverage as determined by her insurance carrier. She is aware that she may receive a bill and has provided verbal consent to proceed: Yes    This virtual visit was conducted via [employer].   Pursuant to the emergency declaration under the [facility], [address] [place] [address] authority and the [employer] Act, this Virtual  Visit was conducted to reduce the patient's risk of exposure to COVID-19 and provide continuity of care for an established patient. Services were provided through a video synchronous discussion virtually to substitute for in-person clinic visit. Due to this being a TeleHealth evaluation, many elements of the physical examination are unable to be assessed. Total Time: minutes: 5-10 minutes. Disclaimer:  Advised patient to call back or return to office if symptoms worsen/change/persist.  Discussed expected course/resolution/complications of diagnosis in detail with patient. Medication risks/benefits/alternatives discussed with patient. Patient was given an after visit summary which includes diagnoses, current medications, & vitals. Discussed patient instructions and advised to read to all patient instructions regarding care. Patient expressed understanding with the diagnosis and plan. This note will not be viewable in [address]         Dee Hand NP  9/3/2020        (This document has been electronically signed)

## 2020-09-04 ENCOUNTER — ANCILLARY PROCEDURE (OUTPATIENT)
Dept: CARDIOLOGY CLINIC | Age: 30
End: 2020-09-04
Payer: COMMERCIAL

## 2020-09-04 ENCOUNTER — CLINICAL SUPPORT (OUTPATIENT)
Dept: CARDIOLOGY CLINIC | Age: 30
End: 2020-09-04
Payer: COMMERCIAL

## 2020-09-04 VITALS
WEIGHT: 137 LBS | HEIGHT: 65 IN | DIASTOLIC BLOOD PRESSURE: 70 MMHG | SYSTOLIC BLOOD PRESSURE: 110 MMHG | BODY MASS INDEX: 22.82 KG/M2

## 2020-09-04 DIAGNOSIS — R00.0 TACHYCARDIA: Primary | ICD-10-CM

## 2020-09-04 DIAGNOSIS — R00.2 HEART PALPITATIONS: ICD-10-CM

## 2020-09-04 PROCEDURE — 93225 XTRNL ECG REC<48 HRS REC: CPT | Performed by: INTERNAL MEDICINE

## 2020-09-04 PROCEDURE — 93306 TTE W/DOPPLER COMPLETE: CPT | Performed by: INTERNAL MEDICINE

## 2020-09-08 ENCOUNTER — TELEPHONE (OUTPATIENT)
Dept: CARDIOLOGY CLINIC | Age: 30
End: 2020-09-08

## 2020-09-08 LAB
ECHO AO ASC DIAM: 2.56 CM
ECHO AO ROOT DIAM: 2.6 CM
ECHO AV AREA PEAK VELOCITY: 2.31 CM2
ECHO AV AREA VTI: 2.36 CM2
ECHO AV AREA/BSA PEAK VELOCITY: 1.4 CM2/M2
ECHO AV AREA/BSA VTI: 1.4 CM2/M2
ECHO AV MEAN GRADIENT: 3.68 MMHG
ECHO AV PEAK GRADIENT: 6.67 MMHG
ECHO AV PEAK VELOCITY: 129.17 CM/S
ECHO AV VTI: 20 CM
ECHO LA AREA 4C: 12.25 CM2
ECHO LA MAJOR AXIS: 2.32 CM
ECHO LA MINOR AXIS: 1.38 CM
ECHO LA VOL 2C: 27.78 ML (ref 22–52)
ECHO LA VOL 4C: 27.55 ML (ref 22–52)
ECHO LA VOL BP: 30.08 ML (ref 22–52)
ECHO LA VOL/BSA BIPLANE: 17.86 ML/M2 (ref 16–28)
ECHO LA VOLUME INDEX A2C: 16.49 ML/M2 (ref 16–28)
ECHO LA VOLUME INDEX A4C: 16.36 ML/M2 (ref 16–28)
ECHO LV E' LATERAL VELOCITY: 13.9 CM/S
ECHO LV E' SEPTAL VELOCITY: 9.65 CM/S
ECHO LV EDV A2C: 73.01 ML
ECHO LV EDV A4C: 62.45 ML
ECHO LV EDV BP: 69.94 ML (ref 56–104)
ECHO LV EDV INDEX A4C: 37.1 ML/M2
ECHO LV EDV INDEX BP: 41.5 ML/M2
ECHO LV EDV NDEX A2C: 43.3 ML/M2
ECHO LV EJECTION FRACTION A2C: 41 PERCENT
ECHO LV EJECTION FRACTION A4C: 67 PERCENT
ECHO LV EJECTION FRACTION BIPLANE: 55.4 PERCENT (ref 55–100)
ECHO LV ESV A2C: 42.88 ML
ECHO LV ESV A4C: 20.91 ML
ECHO LV ESV BP: 31.2 ML (ref 19–49)
ECHO LV ESV INDEX A2C: 25.5 ML/M2
ECHO LV ESV INDEX A4C: 12.4 ML/M2
ECHO LV ESV INDEX BP: 18.5 ML/M2
ECHO LV INTERNAL DIMENSION DIASTOLIC: 3.41 CM (ref 3.9–5.3)
ECHO LV INTERNAL DIMENSION SYSTOLIC: 2.7 CM
ECHO LV IVSD: 0.71 CM (ref 0.6–0.9)
ECHO LV MASS 2D: 61.9 G (ref 67–162)
ECHO LV MASS INDEX 2D: 36.8 G/M2 (ref 43–95)
ECHO LV POSTERIOR WALL DIASTOLIC: 0.72 CM (ref 0.6–0.9)
ECHO LVOT DIAM: 1.85 CM
ECHO LVOT PEAK GRADIENT: 4.88 MMHG
ECHO LVOT PEAK VELOCITY: 110.5 CM/S
ECHO LVOT SV: 47.2 ML
ECHO LVOT VTI: 17.52 CM
ECHO MV A VELOCITY: 65.83 CM/S
ECHO MV AREA PHT: 3.91 CM2
ECHO MV E DECELERATION TIME (DT): 0.19 S
ECHO MV E VELOCITY: 56.49 CM/S
ECHO MV E/A RATIO: 0.86
ECHO MV E/E' LATERAL: 4.06
ECHO MV E/E' RATIO (AVERAGED): 4.96
ECHO MV E/E' SEPTAL: 5.85
ECHO MV PRESSURE HALF TIME (PHT): 0.06 S
ECHO RA MAJOR AXIS: 2.99 CM
ECHO RV INTERNAL DIMENSION: 2.76 CM
ECHO RV TAPSE: 2.49 CM (ref 1.5–2)
LA VOL DISK BP: 27.84 ML (ref 22–52)
LVOT MG: 2.79 MMHG

## 2020-09-08 NOTE — TELEPHONE ENCOUNTER
Patient calling to speak to Dr. Rupinder Green in regards to her test results (echo) - please advise.       Best # [phone]

## 2020-09-14 ENCOUNTER — TELEPHONE (OUTPATIENT)
Dept: CARDIOLOGY CLINIC | Age: 30
End: 2020-09-14

## 2020-09-14 DIAGNOSIS — F98.8 ATTENTION DEFICIT DISORDER, UNSPECIFIED HYPERACTIVITY PRESENCE: ICD-10-CM

## 2020-09-14 RX ORDER — DEXTROAMPHETAMINE SACCHARATE, AMPHETAMINE ASPARTATE MONOHYDRATE, DEXTROAMPHETAMINE SULFATE AND AMPHETAMINE SULFATE 3.75; 3.75; 3.75; 3.75 MG/1; MG/1; MG/1; MG/1
15 CAPSULE, EXTENDED RELEASE ORAL
Qty: 30 CAP | Refills: 0 | Status: SHIPPED | OUTPATIENT
Start: 2020-10-14 | End: 2020-12-24 | Stop reason: SDUPTHER

## 2020-09-14 RX ORDER — DEXTROAMPHETAMINE SACCHARATE, AMPHETAMINE ASPARTATE MONOHYDRATE, DEXTROAMPHETAMINE SULFATE AND AMPHETAMINE SULFATE 3.75; 3.75; 3.75; 3.75 MG/1; MG/1; MG/1; MG/1
15 CAPSULE, EXTENDED RELEASE ORAL
Qty: 30 CAP | Refills: 0 | Status: SHIPPED | OUTPATIENT
Start: 2020-09-14 | End: 2020-12-24 | Stop reason: SDUPTHER

## 2020-09-14 RX ORDER — DEXTROAMPHETAMINE SACCHARATE, AMPHETAMINE ASPARTATE MONOHYDRATE, DEXTROAMPHETAMINE SULFATE AND AMPHETAMINE SULFATE 3.75; 3.75; 3.75; 3.75 MG/1; MG/1; MG/1; MG/1
15 CAPSULE, EXTENDED RELEASE ORAL
Qty: 30 CAP | Refills: 0 | Status: SHIPPED | OUTPATIENT
Start: 2020-11-14 | End: 2020-12-24 | Stop reason: SDUPTHER

## 2020-09-14 NOTE — TELEPHONE ENCOUNTER
----- Message from Addis Hale MD sent at 9/14/2020  1:30 PM EDT -----  Please let pt know holter was normal. No arrhythmia, average HR in the 90s but normal rhythm. No additional cardiac testing needed and can follow here on prn basis.  thx

## 2020-09-22 DIAGNOSIS — F32.A ANXIETY AND DEPRESSION: ICD-10-CM

## 2020-09-22 DIAGNOSIS — F41.9 ANXIETY AND DEPRESSION: ICD-10-CM

## 2020-09-23 RX ORDER — VENLAFAXINE HYDROCHLORIDE 75 MG/1
CAPSULE, EXTENDED RELEASE ORAL
Qty: 30 CAP | Refills: 0 | Status: SHIPPED | OUTPATIENT
Start: 2020-09-23 | End: 2020-10-09 | Stop reason: SDUPTHER

## 2020-10-08 ENCOUNTER — TELEPHONE (OUTPATIENT)
Dept: PRIMARY CARE CLINIC | Age: 30
End: 2020-10-08

## 2020-10-08 DIAGNOSIS — F41.9 ANXIETY AND DEPRESSION: ICD-10-CM

## 2020-10-08 DIAGNOSIS — F32.A ANXIETY AND DEPRESSION: ICD-10-CM

## 2020-10-09 RX ORDER — VENLAFAXINE HYDROCHLORIDE 75 MG/1
CAPSULE, EXTENDED RELEASE ORAL
Qty: 30 CAP | Refills: 0 | Status: SHIPPED | OUTPATIENT
Start: 2020-10-09 | End: 2020-11-17 | Stop reason: SDUPTHER

## 2020-11-17 DIAGNOSIS — F41.9 ANXIETY AND DEPRESSION: ICD-10-CM

## 2020-11-17 DIAGNOSIS — F32.A ANXIETY AND DEPRESSION: ICD-10-CM

## 2020-11-18 RX ORDER — VENLAFAXINE HYDROCHLORIDE 75 MG/1
CAPSULE, EXTENDED RELEASE ORAL
Qty: 30 CAP | Refills: 0 | Status: SHIPPED | OUTPATIENT
Start: 2020-11-18 | End: 2020-12-17

## 2020-12-17 DIAGNOSIS — F41.9 ANXIETY AND DEPRESSION: ICD-10-CM

## 2020-12-17 DIAGNOSIS — F32.A ANXIETY AND DEPRESSION: ICD-10-CM

## 2020-12-17 RX ORDER — VENLAFAXINE HYDROCHLORIDE 75 MG/1
CAPSULE, EXTENDED RELEASE ORAL
Qty: 30 CAP | Refills: 0 | Status: SHIPPED | OUTPATIENT
Start: 2020-12-17 | End: 2021-01-25 | Stop reason: SDUPTHER

## 2020-12-24 ENCOUNTER — VIRTUAL VISIT (OUTPATIENT)
Dept: PRIMARY CARE CLINIC | Age: 30
End: 2020-12-24
Payer: COMMERCIAL

## 2020-12-24 DIAGNOSIS — R11.10 VOMITING, INTRACTABILITY OF VOMITING NOT SPECIFIED, PRESENCE OF NAUSEA NOT SPECIFIED, UNSPECIFIED VOMITING TYPE: ICD-10-CM

## 2020-12-24 DIAGNOSIS — R19.7 DIARRHEA, UNSPECIFIED TYPE: ICD-10-CM

## 2020-12-24 DIAGNOSIS — F98.8 ATTENTION DEFICIT DISORDER, UNSPECIFIED HYPERACTIVITY PRESENCE: Primary | ICD-10-CM

## 2020-12-24 PROCEDURE — 99214 OFFICE O/P EST MOD 30 MIN: CPT | Performed by: NURSE PRACTITIONER

## 2020-12-24 RX ORDER — DEXTROAMPHETAMINE SACCHARATE, AMPHETAMINE ASPARTATE MONOHYDRATE, DEXTROAMPHETAMINE SULFATE AND AMPHETAMINE SULFATE 3.75; 3.75; 3.75; 3.75 MG/1; MG/1; MG/1; MG/1
15 CAPSULE, EXTENDED RELEASE ORAL
Qty: 30 CAP | Refills: 0 | Status: SHIPPED | OUTPATIENT
Start: 2021-02-24 | End: 2021-04-16 | Stop reason: SDUPTHER

## 2020-12-24 RX ORDER — DEXTROAMPHETAMINE SACCHARATE, AMPHETAMINE ASPARTATE MONOHYDRATE, DEXTROAMPHETAMINE SULFATE AND AMPHETAMINE SULFATE 3.75; 3.75; 3.75; 3.75 MG/1; MG/1; MG/1; MG/1
15 CAPSULE, EXTENDED RELEASE ORAL
Qty: 30 CAP | Refills: 0 | Status: SHIPPED | OUTPATIENT
Start: 2021-01-24 | End: 2021-04-16 | Stop reason: SDUPTHER

## 2020-12-24 RX ORDER — DEXTROAMPHETAMINE SACCHARATE, AMPHETAMINE ASPARTATE MONOHYDRATE, DEXTROAMPHETAMINE SULFATE AND AMPHETAMINE SULFATE 3.75; 3.75; 3.75; 3.75 MG/1; MG/1; MG/1; MG/1
15 CAPSULE, EXTENDED RELEASE ORAL
Qty: 30 CAP | Refills: 0 | Status: SHIPPED | OUTPATIENT
Start: 2020-12-24 | End: 2021-04-16 | Stop reason: SDUPTHER

## 2020-12-24 NOTE — PROGRESS NOTES
[facility]   [address] [facility], [address] [place] [address]  P: [phone]  F: [phone]    ANGELA Douglas is a 31 y.o. female who is seen over telehealth for Medication Refill, Attention Deficit Disorder, and GI Problem. Presents today for routine refill for Adderall. Continues on 15 mg XR Adderall daily, reports improvement in focus and attention span on the medicine. Denies any adverse side effects. Also reports chronic GI issues including diarrhea and vomiting for several times weekly. Reports a family history of her sister with a similar complaint. She has intolerance to beer and wine, is being encouraged by her significant other to follow-up with a GI specialist.  There are no active problems to display for this patient.      Past Medical History:   Diagnosis Date    Depression     Headache      Past Surgical History:   Procedure Laterality Date    HX APPENDECTOMY       Social History     Socioeconomic History    Marital status:      Spouse name: Not on file    Number of children: Not on file    Years of education: Not on file    Highest education level: Not on file   Occupational History    Not on file   Social Needs    Financial resource strain: Not on file    Food insecurity     Worry: Not on file     Inability: Not on file    Transportation needs     Medical: Not on file     Non-medical: Not on file   Tobacco Use    Smoking status: Never Smoker    Smokeless tobacco: Never Used   Substance and Sexual Activity    Alcohol use: Yes     Comment: rarely    Drug use: Never    Sexual activity: Yes     Partners: Female     Birth control/protection: None   Lifestyle    Physical activity     Days per week: Not on file     Minutes per session: Not on file    Stress: Not on file   Relationships    Social connections     Talks on phone: Not on file     Gets together: Not on file     Attends Mormonism service: Not on file     Active member of club or organization: Not on file     Attends meetings of clubs or organizations: Not on file     Relationship status: Not on file    Intimate partner violence     Fear of current or ex partner: Not on file     Emotionally abused: Not on file     Physically abused: Not on file     Forced sexual activity: Not on file   Other Topics Concern    Not on file   Social History Narrative    Not on file     No family history on file. Allergies   Allergen Reactions    Ceclor [Cefaclor] Rash    Sulfa (Sulfonamide Antibiotics) Rash       Current Outpatient Medications   Medication Sig Dispense Refill    [START ON 2/24/2021] amphetamine-dextroamphetamine XR (ADDERALL XR) 15 mg XR capsule Take 1 Cap by mouth every morning. Max Daily Amount: 15 mg. 30 Cap 0    [START ON 1/24/2021] amphetamine-dextroamphetamine XR (ADDERALL XR) 15 mg XR capsule Take 1 Cap by mouth every morning. Max Daily Amount: 15 mg. 30 Cap 0    amphetamine-dextroamphetamine XR (ADDERALL XR) 15 mg XR capsule Take 1 Cap by mouth every morning. Max Daily Amount: 15 mg. 30 Cap 0    venlafaxine-SR (EFFEXOR-XR) 75 mg capsule TAKE 1 CAPSULE BY MOUTH EVERY DAY 30 Cap 0    Zipsor 25 mg capsule TAKE 1 CAP BY MOUTH AS NEEDED (MIGRAINE). MAY TAKE ANOTHER CAPSULE 2 HOURS LATER, IF HEADACHE PERSISTS. MAX DOSE 2/DAY. 30 Cap 1    topiramate (TOPAMAX) 25 mg tablet Take 1 Tab by mouth nightly. 30 Tab 5    azelastine (ASTELIN) 137 mcg (0.1 %) nasal spray 1 [place] by Both Nostrils route two (2) times a day. Use in each nostril as directed 1 Bottle 0    famotidine (PEPCID) 20 mg tablet Take 20 mg by mouth two (2) times a day.  VIENVA 0.1-20 mg-mcg tab       Cetirizine (ZYRTEC) 10 mg cap Take  by mouth. The medications were reviewed and updated in the medical record. The past medical history, past surgical history, and family history were reviewed and updated in the medical record.     REVIEW OF SYSTEMS   Review of Systems   Constitutional: Negative for fever and malaise/fatigue. HENT: Negative for congestion. Eyes: Negative for blurred vision and pain. Respiratory: Negative for cough and shortness of breath. Cardiovascular: Negative for chest pain and palpitations. Gastrointestinal: Positive for diarrhea and vomiting. Negative for abdominal pain and heartburn. Genitourinary: Negative for frequency and urgency. Musculoskeletal: Negative for joint pain and myalgias. Neurological: Negative for dizziness, tingling, sensory change, weakness and headaches. Psychiatric/Behavioral: Negative for depression, memory loss and substance abuse. PHYSICAL EXAM   NO VITALS WERE TAKEN FOR THIS VISIT    Physical Exam  Constitutional:       Appearance: Normal appearance. HENT:      Head: Normocephalic and atraumatic. Right Ear: External ear normal.      Left Ear: External ear normal.   Eyes:      Conjunctiva/sclera: Conjunctivae normal.   Pulmonary:      Effort: Pulmonary effort is normal.   Neurological:      Mental Status: She is alert and oriented to person, place, and time. Mental status is at baseline. Psychiatric:         Speech: Speech normal.         Behavior: Behavior normal. Behavior is cooperative. Thought Content: Thought content normal.       ASSESSMENT/ PLAN   Diagnoses and all orders for this visit:    1. Attention deficit disorder, unspecified hyperactivity presence  -     amphetamine-dextroamphetamine XR (ADDERALL XR) 15 mg XR capsule; Take 1 Cap by mouth every morning. Max Daily Amount: 15 mg.  -     amphetamine-dextroamphetamine XR (ADDERALL XR) 15 mg XR capsule; Take 1 Cap by mouth every morning. Max Daily Amount: 15 mg.  -     amphetamine-dextroamphetamine XR (ADDERALL XR) 15 mg XR capsule; Take 1 Cap by mouth every morning. Max Daily Amount: 15 mg.  -Controlled substance agreement on file,  reviewed no concerns, update urine compliance when feasible. 2. Diarrhea, unspecified type  -     REFERRAL TO GASTROENTEROLOGY    3. Vomiting, intractability of vomiting not specified, presence of nausea not specified, unspecified vomiting type  -     REFERRAL TO GASTROENTEROLOGY      I was in the office while conducting this encounter. Consent:  She and/or her healthcare decision maker is aware that this patient-initiated Telehealth encounter is a billable service, with coverage as determined by her insurance carrier. She is aware that she may receive a bill and has provided verbal consent to proceed: Yes    This virtual visit was conducted via [address] Pursuant to the emergency declaration under the [facility][note 3][facility] waiver authority and the [employer] Act, this Virtual  Visit was conducted to reduce the patient's risk of exposure to COVID-19 and provide continuity of care for an established patient. Services were provided through a video synchronous discussion virtually to substitute for in-person clinic visit. Due to this being a TeleHealth evaluation, many elements of the physical examination are unable to be assessed. Total Time: minutes: 21-30 minutes. Disclaimer:  Advised patient to call back or return to office if symptoms worsen/change/persist.  Discussed expected course/resolution/complications of diagnosis in detail with patient. Medication risks/benefits/alternatives discussed with patient. Patient was given an after visit summary which includes diagnoses, current medications, & vitals. Discussed patient instructions and advised to read to all patient instructions regarding care. Patient expressed understanding with the diagnosis and plan. This note will not be viewable in [address]         Judosn Miranda NP  12/24/2020        (This document has been electronically signed)

## 2021-01-08 ENCOUNTER — VIRTUAL VISIT (OUTPATIENT)
Dept: PRIMARY CARE CLINIC | Age: 31
End: 2021-01-08
Payer: COMMERCIAL

## 2021-01-08 DIAGNOSIS — H02.823 EYELID CYST, RIGHT: Primary | ICD-10-CM

## 2021-01-08 PROCEDURE — 99213 OFFICE O/P EST LOW 20 MIN: CPT | Performed by: NURSE PRACTITIONER

## 2021-01-08 RX ORDER — DOXYCYCLINE 100 MG/1
100 TABLET ORAL 2 TIMES DAILY
Qty: 14 TAB | Refills: 0 | Status: SHIPPED | OUTPATIENT
Start: 2021-01-08 | End: 2021-01-15

## 2021-01-08 NOTE — PROGRESS NOTES
[facility]   [address] [facility], [address] [place] [address]  P: [phone]  F: [phone]    SUBJECTIVE   Clearence Dave is a 31 y.o. female who is seen over telehealth for stated concern for a Cyst, to the R upper brow region. States the area is swollen, red, tender to the touch. Denies any drainage to her eye or obvious stye. There are no active problems to display for this patient.      Past Medical History:   Diagnosis Date    Depression     Headache      Past Surgical History:   Procedure Laterality Date    HX APPENDECTOMY       Social History     Socioeconomic History    Marital status:      Spouse name: Not on file    Number of children: Not on file    Years of education: Not on file    Highest education level: Not on file   Occupational History    Not on file   Social Needs    Financial resource strain: Not on file    Food insecurity     Worry: Not on file     Inability: Not on file    Transportation needs     Medical: Not on file     Non-medical: Not on file   Tobacco Use    Smoking status: Never Smoker    Smokeless tobacco: Never Used   Substance and Sexual Activity    Alcohol use: Yes     Comment: rarely    Drug use: Never    Sexual activity: Yes     Partners: Female     Birth control/protection: None   Lifestyle    Physical activity     Days per week: Not on file     Minutes per session: Not on file    Stress: Not on file   Relationships    Social connections     Talks on phone: Not on file     Gets together: Not on file     Attends Pentecostal service: Not on file     Active member of club or organization: Not on file     Attends meetings of clubs or organizations: Not on file     Relationship status: Not on file    Intimate partner violence     Fear of current or ex partner: Not on file     Emotionally abused: Not on file     Physically abused: Not on file     Forced sexual activity: Not on file   Other Topics Concern    Not on file   Social History Narrative  Not on file     No family history on file. Allergies   Allergen Reactions    Ceclor [Cefaclor] Rash    Sulfa (Sulfonamide Antibiotics) Rash       Current Outpatient Medications   Medication Sig Dispense Refill    doxycycline (ADOXA) 100 mg tablet Take 1 Tab by mouth two (2) times a day for 7 days. 14 Tab 0    [START ON 2/24/2021] amphetamine-dextroamphetamine XR (ADDERALL XR) 15 mg XR capsule Take 1 Cap by mouth every morning. Max Daily Amount: 15 mg. 30 Cap 0    [START ON 1/24/2021] amphetamine-dextroamphetamine XR (ADDERALL XR) 15 mg XR capsule Take 1 Cap by mouth every morning. Max Daily Amount: 15 mg. 30 Cap 0    amphetamine-dextroamphetamine XR (ADDERALL XR) 15 mg XR capsule Take 1 Cap by mouth every morning. Max Daily Amount: 15 mg. 30 Cap 0    venlafaxine-SR (EFFEXOR-XR) 75 mg capsule TAKE 1 CAPSULE BY MOUTH EVERY DAY 30 Cap 0    Zipsor 25 mg capsule TAKE 1 CAP BY MOUTH AS NEEDED (MIGRAINE). MAY TAKE ANOTHER CAPSULE 2 HOURS LATER, IF HEADACHE PERSISTS. MAX DOSE 2/DAY. 30 Cap 1    topiramate (TOPAMAX) 25 mg tablet Take 1 Tab by mouth nightly. 30 Tab 5    azelastine (ASTELIN) 137 mcg (0.1 %) nasal spray 1 [place] by Both Nostrils route two (2) times a day. Use in each nostril as directed 1 Bottle 0    famotidine (PEPCID) 20 mg tablet Take 20 mg by mouth two (2) times a day.  VIENVA 0.1-20 mg-mcg tab       Cetirizine (ZYRTEC) 10 mg cap Take  by mouth. The medications were reviewed and updated in the medical record. The past medical history, past surgical history, and family history were reviewed and updated in the medical record. REVIEW OF SYSTEMS   Review of Systems   Constitutional: Negative for malaise/fatigue. HENT: Negative for congestion. Eyes: Negative for blurred vision and pain. Respiratory: Negative for cough and shortness of breath. Cardiovascular: Negative for chest pain and palpitations. Gastrointestinal: Negative for abdominal pain and heartburn. Genitourinary: Negative for frequency and urgency. Musculoskeletal: Negative for joint pain and myalgias. Neurological: Negative for dizziness, tingling, sensory change, weakness and headaches. Psychiatric/Behavioral: Negative for depression, memory loss and substance abuse. PHYSICAL EXAM   NO VITALS WERE TAKEN FOR THIS VISIT    Physical Exam  Constitutional:       Appearance: Normal appearance. HENT:      Head: Normocephalic and atraumatic. Right Ear: External ear normal.      Left Ear: External ear normal.   Eyes:      General: Lids are normal.         Right eye: No hordeolum. Conjunctiva/sclera: Conjunctivae normal.        Comments: R upper eyelid viewed via webcam- circumscribed erythematous area pt reports TTP. Pulmonary:      Effort: Pulmonary effort is normal.   Neurological:      Mental Status: She is alert and oriented to person, place, and time. Mental status is at baseline. Psychiatric:         Speech: Speech normal.         Behavior: Behavior normal. Behavior is cooperative. Thought Content: Thought content normal.       ASSESSMENT/ PLAN   Diagnoses and all orders for this visit:    1. Eyelid cyst, right  -     doxycycline (ADOXA) 100 mg tablet; Take 1 Tab by mouth two (2) times a day for 7 days.  -For inflammation, cold compress. May take ibuprofen OTC for pain. I was in the office while conducting this encounter. Consent:  She and/or her healthcare decision maker is aware that this patient-initiated Telehealth encounter is a billable service, with coverage as determined by her insurance carrier. She is aware that she may receive a bill and has provided verbal consent to proceed: Yes    This virtual visit was conducted via [address]  Pursuant to the emergency declaration under the [facility], [address] [place] [address] authority and the [employer] Act, this Virtual  Visit was conducted to reduce the patient's risk of exposure to COVID-19 and provide continuity of care for an established patient. Services were provided through a video synchronous discussion virtually to substitute for in-person clinic visit. Due to this being a TeleHealth evaluation, many elements of the physical examination are unable to be assessed. Total Time: minutes: 11-20 minutes. Disclaimer:  Advised patient to call back or return to office if symptoms worsen/change/persist.  Discussed expected course/resolution/complications of diagnosis in detail with patient. Medication risks/benefits/alternatives discussed with patient. Patient was given an after visit summary which includes diagnoses, current medications, & vitals. Discussed patient instructions and advised to read to all patient instructions regarding care. Patient expressed understanding with the diagnosis and plan. This note will not be viewable in [address]         Claudia Kaur NP  1/8/2021        (This document has been electronically signed)

## 2021-01-25 DIAGNOSIS — F32.A ANXIETY AND DEPRESSION: ICD-10-CM

## 2021-01-25 DIAGNOSIS — F41.9 ANXIETY AND DEPRESSION: ICD-10-CM

## 2021-01-25 DIAGNOSIS — F98.8 ATTENTION DEFICIT DISORDER, UNSPECIFIED HYPERACTIVITY PRESENCE: ICD-10-CM

## 2021-01-25 RX ORDER — DEXTROAMPHETAMINE SACCHARATE, AMPHETAMINE ASPARTATE MONOHYDRATE, DEXTROAMPHETAMINE SULFATE AND AMPHETAMINE SULFATE 3.75; 3.75; 3.75; 3.75 MG/1; MG/1; MG/1; MG/1
15 CAPSULE, EXTENDED RELEASE ORAL
Qty: 30 CAP | Refills: 0 | Status: CANCELLED | OUTPATIENT
Start: 2021-02-24

## 2021-01-25 RX ORDER — VENLAFAXINE HYDROCHLORIDE 75 MG/1
CAPSULE, EXTENDED RELEASE ORAL
Qty: 30 CAP | Refills: 0 | Status: SHIPPED | OUTPATIENT
Start: 2021-01-25 | End: 2021-02-16 | Stop reason: SDUPTHER

## 2021-03-08 ENCOUNTER — VIRTUAL VISIT (OUTPATIENT)
Dept: PRIMARY CARE CLINIC | Age: 31
End: 2021-03-08
Payer: COMMERCIAL

## 2021-03-08 DIAGNOSIS — J01.01 ACUTE RECURRENT MAXILLARY SINUSITIS: Primary | ICD-10-CM

## 2021-03-08 PROCEDURE — 99213 OFFICE O/P EST LOW 20 MIN: CPT | Performed by: NURSE PRACTITIONER

## 2021-03-08 RX ORDER — AZITHROMYCIN 250 MG/1
TABLET, FILM COATED ORAL
Qty: 6 TAB | Refills: 0 | Status: SHIPPED | OUTPATIENT
Start: 2021-03-08 | End: 2021-03-13

## 2021-03-08 RX ORDER — AZELASTINE 1 MG/ML
1 SPRAY, METERED NASAL 2 TIMES DAILY
Qty: 1 BOTTLE | Refills: 0 | Status: SHIPPED | OUTPATIENT
Start: 2021-03-08 | End: 2021-03-30

## 2021-03-08 NOTE — PROGRESS NOTES
[facility]   [address] [facility], [address] [place] [address]  P: [phone]  F: [phone]    ANGELA Morales is a 31 y.o. female who is seen over telehealth for Sinus Infection, states she has had nasal stuffiness, dry/runny nose for the 1+ month. She has been taking Sudafed and NyQuil as needed. She reports some postnasal drainage chest tightness, and cough as well. No known sick or Covid exposures. There are no active problems to display for this patient.      Past Medical History:   Diagnosis Date    Depression     Headache      Past Surgical History:   Procedure Laterality Date    HX APPENDECTOMY       Social History     Socioeconomic History    Marital status:      Spouse name: Not on file    Number of children: Not on file    Years of education: Not on file    Highest education level: Not on file   Occupational History    Not on file   Social Needs    Financial resource strain: Not on file    Food insecurity     Worry: Not on file     Inability: Not on file    Transportation needs     Medical: Not on file     Non-medical: Not on file   Tobacco Use    Smoking status: Never Smoker    Smokeless tobacco: Never Used   Substance and Sexual Activity    Alcohol use: Yes     Comment: rarely    Drug use: Never    Sexual activity: Yes     Partners: Female     Birth control/protection: None   Lifestyle    Physical activity     Days per week: Not on file     Minutes per session: Not on file    Stress: Not on file   Relationships    Social connections     Talks on phone: Not on file     Gets together: Not on file     Attends Latter day service: Not on file     Active member of club or organization: Not on file     Attends meetings of clubs or organizations: Not on file     Relationship status: Not on file    Intimate partner violence     Fear of current or ex partner: Not on file     Emotionally abused: Not on file     Physically abused: Not on file     Forced sexual activity: Not on file   Other Topics Concern    Not on file   Social History Narrative    Not on file     No family history on file. Allergies   Allergen Reactions    Ceclor [Cefaclor] Rash    Sulfa (Sulfonamide Antibiotics) Rash       Current Outpatient Medications   Medication Sig Dispense Refill    azelastine (ASTELIN) 137 mcg (0.1 %) nasal spray 1 [place] by Both Nostrils route two (2) times a day. Use in each nostril as directed  Indications: seasonal runny nose 1 Bottle 0    azithromycin (ZITHROMAX) 250 mg tablet Take 2 tablets today, then take 1 tablet daily 6 Tab 0    venlafaxine-SR (EFFEXOR-XR) 75 mg capsule TAKE 1 CAPSULE BY MOUTH EVERY DAY 90 Cap 1    amphetamine-dextroamphetamine XR (ADDERALL XR) 15 mg XR capsule Take 1 Cap by mouth every morning. Max Daily Amount: 15 mg. 30 Cap 0    amphetamine-dextroamphetamine XR (ADDERALL XR) 15 mg XR capsule Take 1 Cap by mouth every morning. Max Daily Amount: 15 mg. 30 Cap 0    amphetamine-dextroamphetamine XR (ADDERALL XR) 15 mg XR capsule Take 1 Cap by mouth every morning. Max Daily Amount: 15 mg. 30 Cap 0    Zipsor 25 mg capsule TAKE 1 CAP BY MOUTH AS NEEDED (MIGRAINE). MAY TAKE ANOTHER CAPSULE 2 HOURS LATER, IF HEADACHE PERSISTS. MAX DOSE 2/DAY. 30 Cap 1    topiramate (TOPAMAX) 25 mg tablet Take 1 Tab by mouth nightly. 30 Tab 5    azelastine (ASTELIN) 137 mcg (0.1 %) nasal spray 1 [place] by Both Nostrils route two (2) times a day. Use in each nostril as directed 1 Bottle 0    famotidine (PEPCID) 20 mg tablet Take 20 mg by mouth two (2) times a day.  VIENVA 0.1-20 mg-mcg tab       Cetirizine (ZYRTEC) 10 mg cap Take  by mouth. The medications were reviewed and updated in the medical record. The past medical history, past surgical history, and family history were reviewed and updated in the medical record. REVIEW OF SYSTEMS   Review of Systems   Constitutional: Negative for malaise/fatigue.    HENT: Positive for congestion and sinus pain. Eyes: Negative for blurred vision and pain. Respiratory: Positive for cough. Negative for shortness of breath. Cardiovascular: Negative for chest pain and palpitations. Gastrointestinal: Negative for abdominal pain and heartburn. Genitourinary: Negative for frequency and urgency. Musculoskeletal: Negative for joint pain and myalgias. Neurological: Negative for dizziness, tingling, sensory change, weakness and headaches. Psychiatric/Behavioral: Negative for depression, memory loss and substance abuse. PHYSICAL EXAM   NO VITALS WERE TAKEN FOR THIS VISIT    Physical Exam  Constitutional:       Appearance: Normal appearance. HENT:      Head: Normocephalic and atraumatic. Right Ear: External ear normal.      Left Ear: External ear normal.   Eyes:      Conjunctiva/sclera: Conjunctivae normal.   Pulmonary:      Effort: Pulmonary effort is normal.   Neurological:      Mental Status: She is alert and oriented to person, place, and time. Mental status is at baseline. Psychiatric:         Speech: Speech normal.         Behavior: Behavior normal. Behavior is cooperative. Thought Content: Thought content normal.       ASSESSMENT/ PLAN   Diagnoses and all orders for this visit:    1. Acute recurrent maxillary sinusitis  -     azelastine (ASTELIN) 137 mcg (0.1 %) nasal spray; 1 [place] by Both Nostrils route two (2) times a day. Use in each nostril as directed  Indications: seasonal runny nose  -     azithromycin (ZITHROMAX) 250 mg tablet; Take 2 tablets today, then take 1 tablet daily  -Sinus Treatments:  1. Nasal rinses:  Saline rinses or salt water rinses or Neti pot  2. Anti-histamines: allegra (fenofexadine) or claritn (loratidine) or zyrtec (certizine)  3. Nasal steroids: Nasacort and Flonase (are over the counter & prescription)    I was in the office while conducting this encounter.     Consent:  She and/or her healthcare decision maker is aware that this patient-initiated Telehealth encounter is a billable service, with coverage as determined by her insurance carrier. She is aware that she may receive a bill and has provided verbal consent to proceed: Yes    This virtual visit was conducted via [address] Pursuant to the emergency declaration under the [facility] waiver authority and the [employer] Act, this Virtual  Visit was conducted to reduce the patient's risk of exposure to COVID-19 and provide continuity of care for an established patient. Services were provided through a video synchronous discussion virtually to substitute for in-person clinic visit. Due to this being a TeleHealth evaluation, many elements of the physical examination are unable to be assessed. Total Time: minutes: 11-20 minutes. Disclaimer:  Advised patient to call back or return to office if symptoms worsen/change/persist.  Discussed expected course/resolution/complications of diagnosis in detail with patient. Medication risks/benefits/alternatives discussed with patient. Patient was given an after visit summary which includes diagnoses, current medications, & vitals. Discussed patient instructions and advised to read to all patient instructions regarding care. Patient expressed understanding with the diagnosis and plan.            Anna Marie Carter NP  3/8/2021        (This document has been electronically signed)

## 2021-03-30 DIAGNOSIS — J01.01 ACUTE RECURRENT MAXILLARY SINUSITIS: ICD-10-CM

## 2021-03-30 RX ORDER — AZELASTINE 1 MG/ML
SPRAY, METERED NASAL
Qty: 1 BOTTLE | Refills: 1 | Status: SHIPPED | OUTPATIENT
Start: 2021-03-30

## 2021-04-09 ENCOUNTER — OFFICE VISIT (OUTPATIENT)
Dept: PRIMARY CARE CLINIC | Age: 31
End: 2021-04-09
Payer: COMMERCIAL

## 2021-04-09 VITALS
HEART RATE: 102 BPM | OXYGEN SATURATION: 98 % | HEIGHT: 65 IN | WEIGHT: 139 LBS | SYSTOLIC BLOOD PRESSURE: 125 MMHG | TEMPERATURE: 98 F | DIASTOLIC BLOOD PRESSURE: 81 MMHG | BODY MASS INDEX: 23.16 KG/M2

## 2021-04-09 DIAGNOSIS — R04.0 RECURRENT EPISTAXIS: ICD-10-CM

## 2021-04-09 DIAGNOSIS — F90.9 ATTENTION DEFICIT HYPERACTIVITY DISORDER (ADHD), UNSPECIFIED ADHD TYPE: Primary | ICD-10-CM

## 2021-04-09 DIAGNOSIS — G25.81 RESTLESS LEG SYNDROME: ICD-10-CM

## 2021-04-09 PROCEDURE — 99214 OFFICE O/P EST MOD 30 MIN: CPT | Performed by: FAMILY MEDICINE

## 2021-04-09 NOTE — PROGRESS NOTES
HPI     Chief Complaint   Patient presents with    Medication Refill     She is a 31 y.o. female who presents for med refill. Was previously seen by Kathryne Skiff. Was diagnosed with ADHD at [facility]. No records on file. States she likes her current Rx dose but thinks it may need to be increased but would like to see Psych to discuss this. Denies any adverse effects. States she has tachycardia at baseline. Has seen Cards for this who states it okay to continue Adderall. Denies drug use. States she has had leg jerking in the middle of the night. States she mentioned this to Neuro who didn't think much of it. Also notes she has had frequent nosebleeds with allergy season. Has been using Nasonex. States that it bleeds only for a short time and then will crust over. Review of Systems   Constitutional: Negative for fever. Respiratory: Negative for shortness of breath. Cardiovascular: Negative for chest pain and palpitations. Reviewed PmHx, RxHx, FmHx, SocHx, AllgHx and updated and dated in the chart. Physical Exam:  Visit Vitals  /81 (BP 1 Location: Right upper arm, BP Patient Position: Sitting)   Pulse (!) 102   Temp 98 °F (36.7 °C) (Temporal)   Ht 5' 5\" (1.651 m)   Wt 139 lb (63 kg)   LMP 04/02/2021   SpO2 98%   BMI 23.13 kg/m²     Physical Exam  Vitals signs and nursing note reviewed. Constitutional:       General: She is not in acute distress. Appearance: Normal appearance. She is not ill-appearing. HENT:      Nose:      Comments: Superficial vessels in L nares. No active bleeding. Cardiovascular:      Rate and Rhythm: Regular rhythm. Tachycardia present. Heart sounds: No murmur. Pulmonary:      Effort: Pulmonary effort is normal. No respiratory distress. Breath sounds: Normal breath sounds. Neurological:      General: No focal deficit present. Mental Status: She is alert.    Psychiatric:         Mood and Affect: Mood normal. Behavior: Behavior normal.         No results found for this or any previous visit (from the past 12 hour(s)). Assessment / Plan     Diagnoses and all orders for this visit:    1. Attention deficit hyperactivity disorder (ADHD), unspecified ADHD type  -     COMPLIANCE DRUG SCREEN/PRESCRIPTION MONITORING; Future    2. Recurrent epistaxis  -     REFERRAL TO ENT-OTOLARYNGOLOGY    3. Restless leg syndrome  -     METABOLIC PANEL, COMPREHENSIVE; Future  -     MAGNESIUM; Future  -     TSH 3RD GENERATION; Future       Discussed I would need formal ADHD records before being able to prescribe meds. She did sign pain management agreement today. Will get UDS. Once we get records I can send in her Rx. Gave her info for psych in the area to establish for dose adjustments. Unsure if she has RLS? Will check labs. Refer to ENT for recurrent epistaxis. Has superficial vessels in nares on exam that may need to be cauterized. I have discussed the diagnosis with the patient and the intended plan as seen in the above orders. The patient has received an after-visit summary and questions were answered concerning future plans. I have discussed medication side effects and warnings with the patient as well.     Ricky Wade, DO  Family Medicine Resident

## 2021-04-09 NOTE — PATIENT INSTRUCTIONS
Restless Legs Syndrome: Care Instructions Your Care Instructions Restless legs syndrome is a common nervous system problem. People with this syndrome feel a creeping, achy, or unpleasant feeling in the legs and an overpowering urge to move them. It often occurs in the evening and at night and can lead to sleep problems and tiredness. Your doctor may suggest doing a study of your sleep patterns to figure out what is happening when you try to sleep. Many people get relief from symptoms when they get regular exercise, eat well, and avoid caffeine, alcohol, and tobacco. 
Follow-up care is a key part of your treatment and safety. Be sure to make and go to all appointments, and call your doctor if you are having problems. It's also a good idea to know your test results and keep a list of the medicines you take. How can you care for yourself at home? · Take your medicines exactly as prescribed. Call your doctor if you think you are having a problem with your medicine. · Try bathing in hot or cold water. Applying a heating pad or ice bag to your legs may also help symptoms. · Stretch and massage your legs before bed or when discomfort begins. · Get some exercise for at least 30 minutes a day on most days of the week. Stop exercising at least 3 hours before bedtime. · Try to plan for situations where you will need to remain seated for long stretches. For example, if you are traveling by car, plan some stops so you can get out and walk around. · Tell your doctor about any medicines you are taking. This includes all over-the-counter, prescription, and herbal medicines. Some medicines, such as antidepressants, antihistamines, and cold and sinus medicines, can make your symptoms worse. · Avoid caffeine products, such as coffee, tea, cola, and chocolate. Caffeine can interrupt your sleep and stimulate you. · Do not smoke. Nicotine can make restless legs worse.  If you need help quitting, talk to your doctor about stop-smoking programs and medicines. These can increase your chances of quitting for good. · Do not drink alcohol late in the evening. Take steps to help you sleep better · Get plenty of sunlight in the outdoors, particularly later in the afternoon. · Use the evening hours for settling down. Avoid activities that challenge you in the hours before bedtime. · Eat meals at regular times, and do not snack before bedtime. · Keep your bedroom quiet, dark, and cool. Try using a sleep mask and earplugs to help you sleep. · Limit how much you drink at night to reduce your need to get up to urinate. But do not go to bed thirsty. · Run a fan or other steady \"white noise\" during the night if noises wake you up. · [place] the bed for sleeping and sex. Do your reading or TV watching in another room. · Once you are in bed, relax from head to toe, and guide your mind to pleasant thoughts. · Do not stay in bed longer than 8 hours, and try to avoid naps. When should you call for help? Watch closely for changes in your health, and be sure to contact your doctor if: 
  · You are still not getting enough sleep.  
  · Your symptoms become more severe or happen more often. Where can you learn more? Go to http://www.gray.com/ Enter B988 in the search box to learn more about \"Restless Legs Syndrome: Care Instructions. \" Current as of: August 4, 2020               Content Version: 12.8 © 9956-3625 [employer] Care instructions adapted under license by [employer] (which disclaims liability or warranty for this information). If you have questions about a medical condition or this instruction, always ask your healthcare professional. [place] [address] any warranty or liability for your use of this information. 
  
 Nosebleeds: Care Instructions Your Care Instructions Nosebleeds are common, especially if you have colds or allergies.  Many things can cause a nosebleed. Some nosebleeds stop on their own with pressure. Others need packing. Some get cauterized (sealed). If you have gauze or other packing materials in your nose, you will need to follow up with your doctor to have the packing removed. You may need more treatment if you get nosebleeds a lot. The doctor has checked you carefully, but problems can develop later. If you notice any problems or new symptoms, get medical treatment right away. Follow-up care is a key part of your treatment and safety. Be sure to make and go to all appointments, and call your doctor if you are having problems. It's also a good idea to know your test results and keep a list of the medicines you take. How can you care for yourself at home? · If you get another nosebleed: ? Sit up and tilt your head slightly forward. This keeps blood from going down your throat. ? Use your thumb and index finger to pinch your nose shut for 10 minutes. Use a clock. Do not check to see if the bleeding has stopped before the 10 minutes are up. If the bleeding has not stopped, pinch your nose shut for another 10 minutes. ? When the bleeding has stopped, try not to pick, rub, or blow your nose for 12 hours. Avoiding these things helps keep your nose from bleeding again. · If your doctor prescribed antibiotics, take them as directed. Do not stop taking them just because you feel better. You need to take the full course of antibiotics. To prevent nosebleeds · Do not blow your nose too hard. · Try not to lift or strain after a nosebleed. · Raise your head on a pillow while you sleep. · Put a thin layer of a saline- or water-based nasal gel, such as NasoGel, inside your nose. Put it on the septum, which divides your nostrils. This will prevent dryness that can cause nosebleeds. · Use a vaporizer or humidifier to add moisture to your bedroom. Follow the directions for cleaning the machine.  
· Do not use aspirin, ibuprofen (Advil, Motrin), or naproxen (Aleve) for 36 to 48 hours after a nosebleed unless your doctor tells you to. You can use acetaminophen (Tylenol) for pain relief. · Talk to your doctor about stopping any other medicines you are taking. Some medicines may make you more likely to get a nosebleed. · Do not use cold medicines or nasal sprays without first talking to your doctor. They can make your nose dry. When should you call for help? Call 911 anytime you think you may need emergency care. For example, call if: 
  · You passed out (lost consciousness). Call your doctor now or seek immediate medical care if: 
  · You get another nosebleed and your nose is still bleeding after you have applied pressure 3 times for 10 minutes each time (30 minutes total).  
  · There is a lot of blood running down the back of your throat even after you pinch your nose and tilt your head forward.  
  · You have a fever.  
  · You have sinus pain. Watch closely for changes in your health, and be sure to contact your doctor if: 
  · You get nosebleeds often, even if they stop.  
  · You do not get better as expected. Where can you learn more? Go to http://www.verma.com/ Enter S156 in the search box to learn more about \"Nosebleeds: Care Instructions. \" Current as of: February 26, 2020               Content Version: 12.8 © 7757-1604 [employer]. Care instructions adapted under license by [employer] (which disclaims liability or warranty for this information). If you have questions about a medical condition or this instruction, always ask your healthcare professional. [place] [address] any warranty or liability for your use of this information. 
  
Yvette Romero, [facility], [address] [place] [address] Phone: [phone] [facility], [address] [place], [address] Phone: [phone] Maxwell Navas Elizabeth Carrion, [address], [place], [address] Phone: [phone] Torie Reynoso, [place], [address] Phone: [phone] [address] [place] [address], [address] [place], [address] Phone: [phone] Fax: [phone] [address] [facility], [address] [place], [address] Phone: [phone] Fax: [phone] [place] [address] [phone] [address] [place] [address], [address] 
[phone]

## 2021-04-09 NOTE — LETTER
Tete Daly GAL:[birth year] MR #:[id number] Provider Name:Waldemar Anthony Hickey NP  
*JAAD-624* BSMG-491 (5/16) Page 1 of 5 Initial [facility] CONTROLLED SUBSTANCE AGREEMENT I may be prescribed medications that are controlled substances as part  of my treatment plan for management of my medical condition(s). The goal of my treatment plan is to maintain and/or improve my health and wellbeing. Because controlled substances have an increased risk of abuse or harm, continual re-evaluation is needed determine if the goals of my treatment plan are being met for my safety and the safety of others. Ashley Hahn  am entering into this Controlled Substance Agreement with my provider, Damaris Rascon NP at [place] [address] . I understand that successful treatment requires mutual trust and honesty between me and my provider. I understand that there are state and federal laws and regulations which apply to the medications that my provider may prescribe that must be followed. I understand there are risks and benefits ts of taking the medicines that my provider may prescribe. I understand and agree that following this Agreement is necessary in continuing my provider-patient relationship and success of my treatment plan. As a part of my treatment plan, I agree to the following: COMMUNICATION: 
 
1. I will communicate fully with my provider about my medical condition(s), including the effect on my daily life and how well my medications are helping. I will tell my provider all of the medications that I take for any reason, including medications I receive from another health care provider, and will notify my provider about all issues, problems or concerns, including any side effects, which may be related to my medications. I understand that this information allows my provider to adjust my treatment plan to help manage my medical condition.  I understand that this information will become part of my permanent medical record. 2. I will notify my provider if I have a history of alcohol/drug misuse/addiction or if I have had treatment for alcohol/drug addiction in the past, or if I have a new problem with or concern about alcohol/drug use/addiction, because this increases the likelihood of high risk behaviors and may lead to serious medical conditions. 3. Females Only: I will notify my provider if I am or become pregnant, or if I intend to become pregnant, or if I intend to breastfeed. I understand that communication of these issues with my provider is important, due to possible effects my medication could have on an unborn fetus or breastfeeding child. Ricardo More ZSR:[birth year] MR #:[id number] Provider Name:Vitor Medeiros NP  
*XFFK-010* BSMG-491 (5/16) Page 2 of 5 Initial [facility] MISUSE OF MEDICATIONS / DRUGS: 
 
1. I agree to take all controlled substances as prescribed, and will not misuse or abuse any controlled substances prescribed by my provider. For my safety, I will not increase the amount of medicine I take without first talking with and getting permission from my provider. 2. If I have a medical emergency, another health care provider may prescribe me medication. If I seek emergency treatment, I will notify my provider within seventy-two (72) hours. 3. I understand that my provider may discuss my use and/or possible misuse/abuse of controlled substances and alcohol, as appropriate, with any health care provider involved in my care, pharmacist or legal authority. ILLEGAL DRUGS: 
 
1. I will not use illegal drugs of any kind, including but not limited to marijuana, heroin, cocaine, or any prescription drug which is not prescribed to me. DRUG DIVERSION / PRESCRIPTION FRAUD: 
 
1. I will not share, sell, trade, give away, or otherwise misuse my prescriptions or medications. 2. I will not alter any prescriptions provided to me by my provider. SINGLE PROVIDER: 
 
1. I agree that all controlled substances that I take will be prescribed only by my provider (or his/her covering provider) under this Agreement. This agreement does not prevent me from seeking emergency medical treatment or receiving pain management related to a surgery. PROTECTING MEDICATIONS: 
 
1. I am responsible for keeping my prescriptions and medications in a safe and secure place including safeguarding them from loss or theft. I understand that lost, stolen or damaged/destroyed prescriptions or medications will not be replaced. Tete Daly VXX:[birth year] MR #:[id number] Provider Name:Anthony Medeiros NP  
*OBSQ-833* BSMG-491 (5/16) Page 3 of 5 Initial [facility] PRESCRIPTION RENEWALS/REFILLS: 
 
1. I will follow my controlled substance medication schedule as prescribed by my provider. 2. I understand and agree that I will make any requests for renewals or refills of my prescriptions only at the time of an office visit or during my providers regular office hours subject to the prescription refill requirements of the individual practice. 3. I understand that my provider may not call in prescriptions for controlled substances to my pharmacy. 4. I understand that my provider may adjust or discontinue these medications as deemed appropriate for my medical treatment plan. This Agreement does not guarantee the prescription of controlled medications. 5. I agree that if my medications are adjusted or discontinued, I will properly dispose of any remaining medications. I understand that I will be required to dispose of any remaining controlled medications prior to being provided with any prescriptions for other controlled medications. 6. I understand that the renewal of my prescription depends on my medical condition, my consistent participation, and my adherence with my treatment plan and this Agreement.  
 
7. I understand that if I do not keep an appointment with my provider, I may not receive a renewal or refill for my controlled substance medication. PRESCRIPTION MONITORING / DRUG TESTIN. I understand that my provider may require me to provide urine, saliva or blood for testing at any time. I understand that this testing will be used to monitor for safety and adherence with my treatment plan and this Agreement. 2. I understand that my provider may ask me to provide an observed urine specimen, which means that a nurse or other health care provider may watch me provide urine, and I agree to cooperate if I am asked to provide an observed specimen. 3. I understand that if I do not provide urine, saliva or blood samples within two (2) hours of my providers request, or other timeframe decided by my provider, my treatment plan could be changed, or my prescriptions and medications may be changed or ended. 4. I understand that urine, saliva and blood test results will be a part of my permanent medical record. Connie Guidry KWA: MR #:[id number] Provider Name:Ralph Medeiros, PRITESH  
*QNSG-665* BSMG-491 () Page 4 of 5 Initial [employer] 5. I understand that my provider is required to obtain a copy of my State Prescription Monitoring Program () Report at any time in order to safely prescribe medications. 6. I will bring all of my prescribed controlled substance medications in their original bottles to all of my scheduled appointments. 7. I understand that my provider may ask me to come to the practice with all of my prescribed medications for a random pill count at any time. I agree to cooperate if I am asked to come in for a random pill count. I understand that if I do not arrive in the timeframe decided by my provider, my treatment plan could be changed, or my prescriptions and medications may be changed or ended.  
 
COOPERATION WITH INVESTIGATIONS: 
 
1. I authorize my provider and my pharmacy to cooperate fully with any local, state, or federal law enforcement agency in the investigation of any possible misuse, sale, or other diversion of my controlled substance prescriptions or medications. RISKS: 
 
 
1. I understand that if I do not adhere to this Agreement in any way, my provider may change my prescriptions, stop prescribing controlled substances or end our provider-patient relationship. 2. If my provider decides to stop prescribing medication, or decides to end our provider-patient relationship,my provider may require that I taper my medications slowly. If necessary, my provider may also provide a prescription for other medications to treat my withdrawal symptoms. UNDERSTANDING THIS AGREEMENT: 
 
I understand that my provider may adjust or stop my prescriptions for controlled substances based on my medical condition and my treatment plan. I understand that this Agreement does not guarantee that I will be prescribed medications or controlled substances. I understand that controlled substances may be just one part 
of my treatment plan. My initial on each page and my signature below shows that I have read each page of this Agreement, I have had an opportunity to ask questions, and all of my questions have been answered to my satisfaction by my provider.  
 
By signing below, I agree to comply with this Agreement, and I understand that if I do not follow the Agreements listed above, my provider may stop 
 
 
 
_________________________________________  Date/Time 4/9/2021 1:18 PM   
             (Patient Signature)

## 2021-04-09 NOTE — PROGRESS NOTES
1. Have you been to the ER, urgent care clinic since your last visit? Hospitalized since your last visit? No    2. Have you seen or consulted any other health care providers outside of the [address] [place] [address] since your last visit? Include any pap smears or colon screening.  No     Visit Vitals  /81 (BP 1 Location: Right upper arm, BP Patient Position: Sitting)   Pulse (!) 112   Temp 98 °F (36.7 °C) (Temporal)   Ht 5' 5\" (1.651 m)   Wt 139 lb (63 kg)   SpO2 98%   BMI 23.13 kg/m²

## 2021-04-10 LAB
ALBUMIN SERPL-MCNC: 3.7 G/DL (ref 3.5–5)
ALBUMIN/GLOB SERPL: 0.9 {RATIO} (ref 1.1–2.2)
ALP SERPL-CCNC: 88 U/L (ref 45–117)
ALT SERPL-CCNC: 19 U/L (ref 12–78)
ANION GAP SERPL CALC-SCNC: 4 MMOL/L (ref 5–15)
AST SERPL-CCNC: 14 U/L (ref 15–37)
BILIRUB SERPL-MCNC: 0.2 MG/DL (ref 0.2–1)
BUN SERPL-MCNC: 11 MG/DL (ref 6–20)
BUN/CREAT SERPL: 13 (ref 12–20)
CALCIUM SERPL-MCNC: 9.1 MG/DL (ref 8.5–10.1)
CHLORIDE SERPL-SCNC: 105 MMOL/L (ref 97–108)
CO2 SERPL-SCNC: 29 MMOL/L (ref 21–32)
CREAT SERPL-MCNC: 0.82 MG/DL (ref 0.55–1.02)
GLOBULIN SER CALC-MCNC: 3.9 G/DL (ref 2–4)
GLUCOSE SERPL-MCNC: 101 MG/DL (ref 65–100)
MAGNESIUM SERPL-MCNC: 2.3 MG/DL (ref 1.6–2.4)
POTASSIUM SERPL-SCNC: 4.2 MMOL/L (ref 3.5–5.1)
PROT SERPL-MCNC: 7.6 G/DL (ref 6.4–8.2)
SODIUM SERPL-SCNC: 138 MMOL/L (ref 136–145)
TSH SERPL DL<=0.05 MIU/L-ACNC: 0.89 UIU/ML (ref 0.36–3.74)

## 2021-04-13 LAB — DRUGS UR: NORMAL

## 2021-04-14 DIAGNOSIS — F98.8 ATTENTION DEFICIT DISORDER, UNSPECIFIED HYPERACTIVITY PRESENCE: ICD-10-CM

## 2021-04-14 RX ORDER — DEXTROAMPHETAMINE SACCHARATE, AMPHETAMINE ASPARTATE MONOHYDRATE, DEXTROAMPHETAMINE SULFATE AND AMPHETAMINE SULFATE 3.75; 3.75; 3.75; 3.75 MG/1; MG/1; MG/1; MG/1
15 CAPSULE, EXTENDED RELEASE ORAL
Qty: 30 CAP | Refills: 0 | OUTPATIENT
Start: 2021-04-14

## 2021-04-15 ENCOUNTER — TELEPHONE (OUTPATIENT)
Dept: PRIMARY CARE CLINIC | Age: 31
End: 2021-04-15

## 2021-04-15 DIAGNOSIS — F98.8 ATTENTION DEFICIT DISORDER, UNSPECIFIED HYPERACTIVITY PRESENCE: ICD-10-CM

## 2021-04-15 NOTE — TELEPHONE ENCOUNTER
Patient needs adderal refilled. Got records this morning and would like to know if it will be refilled

## 2021-04-15 NOTE — TELEPHONE ENCOUNTER
----- Message from Larissa Breaux sent at 4/14/2021  5:48 PM EDT -----  Regarding: /Refill  Medication Refill    Caller (if not patient):N/A      Relationship of caller (if not patient):N/A      Best contact number(s):[phone]      Name of medication and dosage if known:amphetamine-dextroamphetamine XR (ADDERALL XR) 15 mg XR capsule       Is patient out of this medication (yes/no):yes      Pharmacy name:[address] [place] listed in chart? (yes/no):Yes  Pharmacy phone number:N/A      Details to clarify the request: The patient said that she has an appointment with Dr. Holli Bueno on 4/92021, but her refills were denied because she needed to see the Dr Larissa Breaux

## 2021-04-16 PROBLEM — F98.8 ADD (ATTENTION DEFICIT DISORDER): Status: ACTIVE | Noted: 2021-04-16

## 2021-04-16 RX ORDER — DEXTROAMPHETAMINE SACCHARATE, AMPHETAMINE ASPARTATE MONOHYDRATE, DEXTROAMPHETAMINE SULFATE AND AMPHETAMINE SULFATE 3.75; 3.75; 3.75; 3.75 MG/1; MG/1; MG/1; MG/1
15 CAPSULE, EXTENDED RELEASE ORAL
Qty: 30 CAP | Refills: 0 | Status: SHIPPED | OUTPATIENT
Start: 2021-06-17 | End: 2021-07-17

## 2021-04-16 RX ORDER — DEXTROAMPHETAMINE SACCHARATE, AMPHETAMINE ASPARTATE MONOHYDRATE, DEXTROAMPHETAMINE SULFATE AND AMPHETAMINE SULFATE 3.75; 3.75; 3.75; 3.75 MG/1; MG/1; MG/1; MG/1
15 CAPSULE, EXTENDED RELEASE ORAL
Qty: 30 CAP | Refills: 0 | Status: SHIPPED | OUTPATIENT
Start: 2021-04-16 | End: 2021-08-30 | Stop reason: ALTCHOICE

## 2021-04-16 RX ORDER — DEXTROAMPHETAMINE SACCHARATE, AMPHETAMINE ASPARTATE MONOHYDRATE, DEXTROAMPHETAMINE SULFATE AND AMPHETAMINE SULFATE 3.75; 3.75; 3.75; 3.75 MG/1; MG/1; MG/1; MG/1
15 CAPSULE, EXTENDED RELEASE ORAL
Qty: 30 CAP | Refills: 0 | Status: SHIPPED | OUTPATIENT
Start: 2021-05-17 | End: 2021-06-16

## 2021-04-16 NOTE — TELEPHONE ENCOUNTER
Reviewed records. Previously prescribed by Christina Perry (Psych at [facility]).  reviewed and appropriate. UDS is appropriate. Will send to pharmacy for 3 months.

## 2021-06-01 ENCOUNTER — NURSE TRIAGE (OUTPATIENT)
Dept: OTHER | Facility: CLINIC | Age: 31
End: 2021-06-01

## 2021-06-01 ENCOUNTER — OFFICE VISIT (OUTPATIENT)
Dept: PRIMARY CARE CLINIC | Age: 31
End: 2021-06-01
Payer: COMMERCIAL

## 2021-06-01 VITALS
BODY MASS INDEX: 23.46 KG/M2 | DIASTOLIC BLOOD PRESSURE: 83 MMHG | OXYGEN SATURATION: 98 % | RESPIRATION RATE: 18 BRPM | SYSTOLIC BLOOD PRESSURE: 117 MMHG | HEIGHT: 65 IN | TEMPERATURE: 98 F | HEART RATE: 110 BPM | WEIGHT: 140.8 LBS

## 2021-06-01 DIAGNOSIS — J01.10 ACUTE NON-RECURRENT FRONTAL SINUSITIS: Primary | ICD-10-CM

## 2021-06-01 DIAGNOSIS — J06.9 UPPER RESPIRATORY TRACT INFECTION, UNSPECIFIED TYPE: ICD-10-CM

## 2021-06-01 PROCEDURE — 99213 OFFICE O/P EST LOW 20 MIN: CPT | Performed by: FAMILY MEDICINE

## 2021-06-01 RX ORDER — DOXYCYCLINE 100 MG/1
100 TABLET ORAL 2 TIMES DAILY
Qty: 14 TABLET | Refills: 0 | Status: SHIPPED | OUTPATIENT
Start: 2021-06-01 | End: 2021-06-08

## 2021-06-01 RX ORDER — BENZONATATE 100 MG/1
100 CAPSULE ORAL
Qty: 15 CAPSULE | Refills: 0 | Status: SHIPPED | OUTPATIENT
Start: 2021-06-01

## 2021-06-01 NOTE — PROGRESS NOTES
Chief Complaint   Patient presents with    Cough     coughing up mucus, For over a week, tested for covid on the 29th and negative      Reviewed the chart and obtain necessary information for visit. 1. Have you been to the ER, urgent care clinic since your last visit? Hospitalized since your last visit? No    2. Have you seen or consulted any other health care providers outside of the [address] [place] [address] since your last visit? Include any pap smears or colon screening.  No   Visit Vitals  /83 (BP 1 Location: Left upper arm, BP Patient Position: Sitting, BP Cuff Size: Large adult)   Pulse (!) 120   Temp 98 °F (36.7 °C) (Temporal)   Resp 18   Ht 5' 5\" (1.651 m)   Wt 140 lb 12.8 oz (63.9 kg)   LMP 05/04/2021   SpO2 98%   BMI 23.43 kg/m²

## 2021-06-01 NOTE — PROGRESS NOTES
HPI     Chief Complaint   Patient presents with    Cough     coughing up mucus, For over a week, tested for covid on the 29th and negative      She is a 31 y.o. female who presents for cough. Recent travel to [place] and [place]. States she started developing cough/ sore throat >7 days ago. Sometimes cough is productive at night of clear green phlegm. Sometimes blows her nose and has green discharge. Had J&J vaccine. Tested for COVID 5/29 which was negative. States her wife is now sick with similar symptoms. Now having a lot of congestion. Review of Systems   Constitutional: Negative for chills and fever. HENT: Positive for sore throat. Respiratory: Positive for cough. Negative for shortness of breath. Cardiovascular: Negative for chest pain. Reviewed PmHx, RxHx, FmHx, SocHx, AllgHx and updated and dated in the chart. Physical Exam:  Visit Vitals  /83 (BP 1 Location: Left upper arm, BP Patient Position: Sitting, BP Cuff Size: Large adult)   Pulse (!) 110   Temp 98 °F (36.7 °C) (Temporal)   Resp 18   Ht 5' 5\" (1.651 m)   Wt 140 lb 12.8 oz (63.9 kg)   LMP 05/04/2021   SpO2 98%   BMI 23.43 kg/m²     Physical Exam  Vitals and nursing note reviewed. Constitutional:       General: She is not in acute distress. Appearance: Normal appearance. She is not ill-appearing. HENT:      Right Ear: Tympanic membrane normal.      Left Ear: Tympanic membrane normal.      Nose: Congestion and rhinorrhea present. Mouth/Throat:      Mouth: Mucous membranes are moist.      Pharynx: Posterior oropharyngeal erythema present. No oropharyngeal exudate. Cardiovascular:      Rate and Rhythm: Regular rhythm. Tachycardia present. Heart sounds: No murmur heard. Pulmonary:      Effort: Pulmonary effort is normal. No respiratory distress. Breath sounds: Normal breath sounds. Neurological:      General: No focal deficit present. Mental Status: She is alert.    Psychiatric:         Mood and Affect: Mood normal.         Behavior: Behavior normal.       No results found for this or any previous visit (from the past 12 hour(s)). Assessment / Plan     Diagnoses and all orders for this visit:    1. Acute non-recurrent frontal sinusitis  -     doxycycline (ADOXA) 100 mg tablet; Take 1 Tablet by mouth two (2) times a day for 7 days. 2. Upper respiratory tract infection, unspecified type  -     doxycycline (ADOXA) 100 mg tablet; Take 1 Tablet by mouth two (2) times a day for 7 days. -     benzonatate (TESSALON) 100 mg capsule; Take 1 Capsule by mouth three (3) times daily as needed for Cough. Given symptoms have been ongoing >7 days and having green nasal drainage will treat for possible sinusitis/ secondary bacterial infection. Offered CXR but she declined today and would like to see how she does on abx first. At baseline patient has tachycardia and has seen Cards for this. Advised to not take her stimulants while feeling ill. Discussed RTC/ ER precautions including chest pain, shortness of breath, worsening symptoms or no improvement on abx after 48 hours. During today's visit we briefly discussed that ADHD records on file do not include formal ADHD testing. Our office has a new policy that requires formal eval records on file and not just a diagnosis from Psych. She has an appointment with Psych in July at which time she will undergo formal testing. I have discussed the diagnosis with the patient and the intended plan as seen in the above orders. The patient has received an after-visit summary and questions were answered concerning future plans. I have discussed medication side effects and warnings with the patient as well.     Manuel Morris,

## 2021-06-01 NOTE — PATIENT INSTRUCTIONS

## 2021-06-01 NOTE — TELEPHONE ENCOUNTER
Reason for Disposition   Requesting regular office appointment    Answer Assessment - Initial Assessment Questions  1. REASON FOR CALL or QUESTION: \"What is your reason for calling today? \" or \"How can I best help you? \" or \"What question do you have that I can help answer? \"      Requesting office visit for cough, nasal congestion, ear pain. Fully vaccinated for Covid-19 (J&J) in early April 2021, and Covid-19 negative on 5/29. Protocols used: INFORMATION ONLY CALL - NO TRIAGE-ADULT-    Call received on [address]. Brief description of triage: No triage. Pt fully vaccinated for Covid-19 > 2 wks. Covid unlikely. No known exposure. Covid-19 negative on 5/29/21. Sent back to [facility] for scheduling. Care advice provided. Recommended using local department of health website for testing locations and up to date information. Caller verbalizes understanding, denies any other questions or concerns; instructed to call back for any new or worsening symptoms. Unable to route call due to invalid PCP.

## 2021-06-03 ENCOUNTER — OFFICE VISIT (OUTPATIENT)
Dept: PRIMARY CARE CLINIC | Age: 31
End: 2021-06-03
Payer: COMMERCIAL

## 2021-06-03 VITALS
WEIGHT: 139.6 LBS | OXYGEN SATURATION: 99 % | BODY MASS INDEX: 23.26 KG/M2 | HEIGHT: 65 IN | TEMPERATURE: 97.5 F | HEART RATE: 107 BPM | RESPIRATION RATE: 16 BRPM | DIASTOLIC BLOOD PRESSURE: 70 MMHG | SYSTOLIC BLOOD PRESSURE: 101 MMHG

## 2021-06-03 DIAGNOSIS — J06.9 UPPER RESPIRATORY TRACT INFECTION, UNSPECIFIED TYPE: Primary | ICD-10-CM

## 2021-06-03 LAB
S PYO AG THROAT QL: NEGATIVE
VALID INTERNAL CONTROL?: YES

## 2021-06-03 PROCEDURE — 87880 STREP A ASSAY W/OPTIC: CPT | Performed by: FAMILY MEDICINE

## 2021-06-03 PROCEDURE — 99213 OFFICE O/P EST LOW 20 MIN: CPT | Performed by: FAMILY MEDICINE

## 2021-06-03 NOTE — PROGRESS NOTES
Chief Complaint   Patient presents with    Cough     covid19 test done 05/25/21 and 05/29/21 negative. Visit Vitals  /70 (BP 1 Location: Left upper arm, BP Patient Position: Sitting, BP Cuff Size: Adult)   Pulse (!) 107   Temp 97.5 °F (36.4 °C) (Temporal)   Resp 16   Ht 5' 5\" (1.651 m)   Wt 139 lb 9.6 oz (63.3 kg)   LMP 05/04/2021   SpO2 99%   BMI 23.23 kg/m²         1. Have you been to the ER, urgent care clinic since your last visit? Hospitalized since your last visit? No    2. Have you seen or consulted any other health care providers outside of the [address] [place] [address] since your last visit? Include any pap smears or colon screening.  No

## 2021-06-03 NOTE — PATIENT INSTRUCTIONS

## 2021-06-03 NOTE — PROGRESS NOTES
HPI     Chief Complaint   Patient presents with    Cough     covid19 test done 05/25/21 and 05/29/21 negative. She is a 31 y.o. female who presents for cough. Ongoing x8 days. Was started on Doxy two days ago. States symptoms have not worsened since then. Review of Systems   Constitutional: Negative for chills and fever. HENT: Positive for sore throat. Respiratory: Positive for cough. Negative for shortness of breath. Reviewed PmHx, RxHx, FmHx, SocHx, AllgHx and updated and dated in the chart. Physical Exam:  Visit Vitals  /70 (BP 1 Location: Left upper arm, BP Patient Position: Sitting, BP Cuff Size: Adult)   Pulse (!) 107   Temp 97.5 °F (36.4 °C) (Temporal)   Resp 16   Ht 5' 5\" (1.651 m)   Wt 139 lb 9.6 oz (63.3 kg)   LMP 05/04/2021   SpO2 99%   BMI 23.23 kg/m²     Physical Exam  Vitals and nursing note reviewed. Constitutional:       General: She is not in acute distress. Appearance: Normal appearance. She is not ill-appearing. HENT:      Mouth/Throat:      Mouth: Mucous membranes are moist.      Pharynx: Posterior oropharyngeal erythema present. No oropharyngeal exudate. Cardiovascular:      Rate and Rhythm: Regular rhythm. Tachycardia present. Heart sounds: No murmur heard. Pulmonary:      Effort: Pulmonary effort is normal. No respiratory distress. Breath sounds: Normal breath sounds. No wheezing, rhonchi or rales. Neurological:      General: No focal deficit present. Mental Status: She is alert. Psychiatric:         Mood and Affect: Mood normal.         Behavior: Behavior normal.     POC Strep neg   Recent Results (from the past 12 hour(s))   AMB POC RAPID STREP A    Collection Time: 06/03/21 11:40 AM   Result Value Ref Range    VALID INTERNAL CONTROL POC Yes     Group A Strep Ag Negative Negative        Assessment / Plan     Diagnoses and all orders for this visit:    1.  Upper respiratory tract infection, unspecified type  -     AMB POC RAPID STREP A       VSS. Lungs clear. Again offered Xray however she declined today and would like to wait and see how she does. She has an allergy to sulfa, cephalosporin and does not think she has tolerated PCN in the past. Symptoms have not worsened just states cough is bothersome. Encouraged plenty of fluids, Mucinex PRN congestion, cepacol PRN sore throat. Discussed RTC/ ER precautions. I have discussed the diagnosis with the patient and the intended plan as seen in the above orders. The patient has received an after-visit summary and questions were answered concerning future plans. I have discussed medication side effects and warnings with the patient as well.     [place], DO

## 2021-06-25 ENCOUNTER — HOSPITAL ENCOUNTER (OUTPATIENT)
Dept: GENERAL RADIOLOGY | Age: 31
Discharge: HOME | End: 2021-06-25
Attending: FAMILY MEDICINE
Payer: COMMERCIAL

## 2021-06-25 ENCOUNTER — OFFICE VISIT (OUTPATIENT)
Dept: PRIMARY CARE CLINIC | Age: 31
End: 2021-06-25
Payer: COMMERCIAL

## 2021-06-25 VITALS
HEART RATE: 114 BPM | TEMPERATURE: 98.5 F | DIASTOLIC BLOOD PRESSURE: 71 MMHG | WEIGHT: 140 LBS | HEIGHT: 65 IN | OXYGEN SATURATION: 99 % | BODY MASS INDEX: 23.32 KG/M2 | SYSTOLIC BLOOD PRESSURE: 102 MMHG

## 2021-06-25 DIAGNOSIS — R05.9 COUGH: ICD-10-CM

## 2021-06-25 DIAGNOSIS — J02.9 SORE THROAT: ICD-10-CM

## 2021-06-25 DIAGNOSIS — J40 BRONCHITIS: Primary | ICD-10-CM

## 2021-06-25 PROCEDURE — 99213 OFFICE O/P EST LOW 20 MIN: CPT | Performed by: FAMILY MEDICINE

## 2021-06-25 PROCEDURE — 71046 X-RAY EXAM CHEST 2 VIEWS: CPT

## 2021-06-25 RX ORDER — DESOGESTREL AND ETHINYL ESTRADIOL 0.15-0.03
KIT ORAL
COMMUNITY
Start: 2021-04-14

## 2021-06-25 NOTE — PROGRESS NOTES
HPI     Chief Complaint   Patient presents with    URI     still has deep cough, worst at night, unable to sleep; prescribe meds hasn't work; sore throat and sinus congestion     She is a 27 y.o. female who presents for URI. States she was initially feeling better using the Mucinex as directed but cough has lingered. Developed sore throat yesterday. Ayah Fitzgerald is not working well for her. Cough is dry. Has been taking Zyrtec. Review of Systems   Constitutional: Negative for chills and fever. HENT: Positive for sore throat. Negative for ear pain. Respiratory: Negative for cough and shortness of breath. Cardiovascular: Negative for chest pain. Reviewed PmHx, RxHx, FmHx, SocHx, AllgHx and updated and dated in the chart. Physical Exam:  Visit Vitals  /71 (BP 1 Location: Left upper arm, BP Patient Position: Sitting)   Pulse (!) 114   Temp 98.5 °F (36.9 °C) (Oral)   Ht 5' 5\" (1.651 m)   Wt 140 lb (63.5 kg)   SpO2 99%   BMI 23.30 kg/m²     Physical Exam  Vitals and nursing note reviewed. Constitutional:       General: She is not in acute distress. Appearance: Normal appearance. She is not ill-appearing. HENT:      Head:      Comments: No tenderness to palpation over frontal or maxillary sinuses BL. Right Ear: Tympanic membrane normal.      Left Ear: Tympanic membrane normal.      Nose: No congestion or rhinorrhea. Mouth/Throat:      Mouth: Mucous membranes are moist.      Pharynx: Posterior oropharyngeal erythema present. No oropharyngeal exudate. Comments: Cobblestoning mucosa present. Cardiovascular:      Rate and Rhythm: Normal rate and regular rhythm. Heart sounds: No murmur heard. Pulmonary:      Effort: Pulmonary effort is normal. No respiratory distress. Breath sounds: Normal breath sounds. No wheezing, rhonchi or rales. Neurological:      General: No focal deficit present. Mental Status: She is alert.    Psychiatric:         Mood and Affect: Mood normal.         Behavior: Behavior normal.       POC strep neg  No results found for this or any previous visit (from the past 12 hour(s)). Assessment / Plan     Diagnoses and all orders for this visit:    1. Bronchitis    2. Cough  -     XR CHEST PA LAT; Future    3. Sore throat  -     AMB POC RAPID STREP A       Will get CXR given persistent symptoms. Recommend she get COVID testing. Discussed if bronchitis then abx are typically not recommended. Recommend Mucinex and taking Tessalon 200mg every 8 hours PRN. Can give new script if needed. Discussed if she has fever, chills, shortness of breath, chest pain needs to go to ER. Discussed ENT referral. She would like to see how she does over the weekend will call back for referral if not improving. I have discussed the diagnosis with the patient and the intended plan as seen in the above orders. The patient has received an after-visit summary and questions were answered concerning future plans. I have discussed medication side effects and warnings with the patient as well.     Sam Mccrary, DO

## 2021-06-25 NOTE — PROGRESS NOTES
Rafaela Arauz is a 27 y.o. female    Chief Complaint   Patient presents with    URI     still has deep cough, worst at night, unable to sleep; prescribe meds hasn't work; sore throat and sinus congestion         1. Have you been to the ER, urgent care clinic since your last visit? Hospitalized since your last visit? No    2. Have you seen or consulted any other health care providers outside of the [address] [place] [address] since your last visit? Include any pap smears or colon screening.  No    Visit Vitals  /71 (BP 1 Location: Left upper arm, BP Patient Position: Sitting)   Pulse (!) 129   Temp 98.5 °F (36.9 °C) (Oral)   Ht 5' 5\" (1.651 m)   Wt 140 lb (63.5 kg)   SpO2 99%   BMI 23.30 kg/m²

## 2021-06-25 NOTE — PATIENT INSTRUCTIONS
Bronchitis: Care Instructions  Your Care Instructions     Bronchitis is inflammation of the bronchial tubes, which carry air to the lungs. The tubes swell and produce mucus, or phlegm. The mucus and inflamed bronchial tubes make you cough. You may have trouble breathing. Most cases of bronchitis are caused by viruses like those that cause colds. Antibiotics usually do not help and they may be harmful. Bronchitis usually develops rapidly and lasts about 2 to 3 weeks in otherwise healthy people. Follow-up care is a key part of your treatment and safety. Be sure to make and go to all appointments, and call your doctor if you are having problems. It's also a good idea to know your test results and keep a list of the medicines you take. How can you care for yourself at home? · Take all medicines exactly as prescribed. Call your doctor if you think you are having a problem with your medicine. · Get some extra rest.  · Take an over-the-counter pain medicine, such as acetaminophen (Tylenol), ibuprofen (Advil, Motrin), or naproxen (Aleve) to reduce fever and relieve body aches. Read and follow all instructions on the label. · Do not take two or more pain medicines at the same time unless the doctor told you to. Many pain medicines have acetaminophen, which is Tylenol. Too much acetaminophen (Tylenol) can be harmful. · Take an over-the-counter cough medicine that contains dextromethorphan to help quiet a dry, hacking cough so that you can sleep. Avoid cough medicines that have more than one active ingredient. Read and follow all instructions on the label. · Breathe moist air from a humidifier, hot shower, or sink filled with hot water. The heat and moisture will thin mucus so you can cough it out. · Do not smoke. Smoking can make bronchitis worse. If you need help quitting, talk to your doctor about stop-smoking programs and medicines. These can increase your chances of quitting for good.   When should you call for help?   Call 911 anytime you think you may need emergency care. For example, call if:    · You have severe trouble breathing. Call your doctor now or seek immediate medical care if:    · You have new or worse trouble breathing.     · You cough up dark brown or bloody mucus (sputum).     · You have a new or higher fever.     · You have a new rash. Watch closely for changes in your health, and be sure to contact your doctor if:    · You cough more deeply or more often, especially if you notice more mucus or a change in the color of your mucus.     · You are not getting better as expected. Where can you learn more? Go to http://www.gray.com/  Enter H333 in the search box to learn more about \"Bronchitis: Care Instructions. \"  Current as of: October 26, 2020               Content Version: 12.8  © 9263-9283 [employer], [employer]. Care instructions adapted under license by [employer] (which disclaims liability or warranty for this information).  If you have questions about a medical condition or this instruction, always ask your healthcare professional. [address] any warranty or liability for your use of this information.

## 2021-06-26 ENCOUNTER — TELEPHONE (OUTPATIENT)
Dept: PRIMARY CARE CLINIC | Age: 31
End: 2021-06-26

## 2021-06-26 NOTE — TELEPHONE ENCOUNTER
Spoke with patient. She took 200mg of Tessalon, did Flonase and Mucinex this morning and feels somewhat better. Discussed CXR results. Discussed if not feeling better by Tuesday or Wed I would be happy to refer to ENT.

## 2021-08-30 ENCOUNTER — VIRTUAL VISIT (OUTPATIENT)
Dept: PRIMARY CARE CLINIC | Age: 31
End: 2021-08-30
Payer: COMMERCIAL

## 2021-08-30 DIAGNOSIS — J06.9 VIRAL URI: Primary | ICD-10-CM

## 2021-08-30 PROCEDURE — 99213 OFFICE O/P EST LOW 20 MIN: CPT | Performed by: INTERNAL MEDICINE

## 2021-08-30 RX ORDER — CLONIDINE HYDROCHLORIDE 0.1 MG/1
TABLET ORAL
COMMUNITY
Start: 2021-08-19

## 2021-08-30 RX ORDER — DEXTROAMPHETAMINE SACCHARATE, AMPHETAMINE ASPARTATE MONOHYDRATE, DEXTROAMPHETAMINE SULFATE AND AMPHETAMINE SULFATE 7.5; 7.5; 7.5; 7.5 MG/1; MG/1; MG/1; MG/1
25 CAPSULE, EXTENDED RELEASE ORAL
COMMUNITY
Start: 2021-08-27 | End: 2022-06-23 | Stop reason: ALTCHOICE

## 2021-08-30 NOTE — PROGRESS NOTES
Ade Borrego is a 27 y.o. female who was seen by synchronous (real-time) audio-video technology on 8/30/2021 for Concern For COVID-19 (Coronavirus) (pt is going to get tested at [facility] urgent Care at 4:30pm. Pt states her wife works in a hospital.), Sinus Infection, Cold, Headache, and Diarrhea        Assessment & Plan:   Diagnoses and all orders for this visit:    1. Viral URI    asked pt to go ahead and get tested for COVID and follow CDC guidelines if pos. Seems to be common cold based on history. Doubt COVID or Sinus infection. Tylenol, rest , fluids. I spent at least 20 minutes on this visit with this established patient. Subjective:       Prior to Admission medications    Medication Sig Start Date End Date Taking? Authorizing Provider   cloNIDine HCL (CATAPRES) 0.1 mg tablet  8/19/21  Yes Provider, Historical   amphetamine-dextroamphetamine XR (ADDERALL XR) 30 mg XR capsule  8/27/21  Yes Provider, Historical   Apri 0.15-0.03 mg tab TAKE 1 TABLET BY MOUTH EVERY DAY 4/14/21  Yes Provider, Historical   venlafaxine-SR (EFFEXOR-XR) 75 mg capsule TAKE 1 CAPSULE BY MOUTH EVERY DAY 2/17/21  Yes Dayo July, PRITESH   Cetirizine (ZYRTEC) 10 mg cap Take  by mouth. Yes Provider, Historical   benzonatate (TESSALON) 100 mg capsule Take 1 Capsule by mouth three (3) times daily as needed for Cough. Patient not taking: Reported on 8/30/2021 6/1/21   Milon Virgie, DO   amphetamine-dextroamphetamine XR (ADDERALL XR) 15 mg XR capsule Take 1 Cap by mouth every morning. Max Daily Amount: 15 mg. 4/16/21 8/30/21  Milon Virgie, DO   azelastine (ASTELIN) 137 mcg (0.1 %) nasal spray INSTILL 1 SPRAY IN BOTH NOSTRILS TWICE DAILY  Patient not taking: Reported on 6/1/2021 3/30/21   Dayo Brock, NP   Zipsor 25 mg capsule TAKE 1 CAP BY MOUTH AS NEEDED (MIGRAINE). MAY TAKE ANOTHER CAPSULE 2 HOURS LATER, IF HEADACHE PERSISTS. MAX DOSE 2/DAY.   Patient not taking: Reported on 6/1/2021 8/10/20   Mamta Feliciano MD topiramate (TOPAMAX) 25 mg tablet Take 1 Tab by mouth nightly. Patient not taking: Reported on 6/1/2021 6/16/20   Indigo Tyson MD   azelastine (ASTELIN) 137 mcg (0.1 %) nasal spray 1 [place] by Both Nostrils route two (2) times a day. Use in each nostril as directed  Patient not taking: Reported on 6/1/2021 2/24/20   Lori Aguilar NP   famotidine (PEPCID) 20 mg tablet Take 20 mg by mouth two (2) times a day. Patient not taking: Reported on 6/1/2021    Provider, Historical   VIENVA 0.1-20 mg-mcg tab  8/17/19   Provider, Historical     HIstory  Pt has symptoms of sinus congestion, headaches, runny nose and upset stomach - nausea and diarrhea. Symptoms started feeling sick yesterday. Her wife works in the hospital in a lab. There were COVID cases in the hospital.  Pt has had sinus infection in the past.  HOwever it felt different. Pt is getting tested for COVID this afternoon. Pt just wants an opinion. NO fever or chills, NO SOB, no loss of taste or smell      ROS    Objective:     Patient-Reported Vitals 8/30/2021   Patient-Reported Temperature 97.9        [INSTRUCTIONS:  \"[x]\" Indicates a positive item  \"[]\" Indicates a negative item  -- DELETE ALL ITEMS NOT EXAMINED]    Constitutional: [x] Appears well-developed and well-nourished [x] No apparent distress      [] Abnormal -     Mental status: [x] Alert and awake  [x] Oriented to person/place/time [x] Able to follow commands    [] Abnormal - mild prostration.     Eyes:   EOM    [x]  Normal    [] Abnormal -   Sclera  [x]  Normal    [] Abnormal -          Discharge [x]  None visible   [] Abnormal -     HENT: [x] Normocephalic, atraumatic  [] Abnormal -   [x] Mouth/Throat: Mucous membranes are moist    External Ears [x] Normal  [] Abnormal -    Neck: [x] No visualized mass [] Abnormal -     Pulmonary/Chest: [x] Respiratory effort normal   [x] No visualized signs of difficulty breathing or respiratory distress        [] Abnormal -      Musculoskeletal:   [x] Normal gait with no signs of ataxia         [x] Normal range of motion of neck        [] Abnormal -     Neurological:        [x] No Facial Asymmetry (Cranial nerve 7 motor function) (limited exam due to video visit)          [x] No gaze palsy        [] Abnormal -          Skin:        [x] No significant exanthematous lesions or discoloration noted on facial skin         [] Abnormal -            Psychiatric:       [x] Normal Affect [] Abnormal -        [x] No Hallucinations    Other pertinent observable physical exam findings:-        We discussed the expected course, resolution and complications of the diagnosis(es) in detail. Medication risks, benefits, costs, interactions, and alternatives were discussed as indicated. I advised her to contact the office if her condition worsens, changes or fails to improve as anticipated. She expressed understanding with the diagnosis(es) and plan. Joseluis Trejo, was evaluated through a synchronous (real-time) audio-video encounter. The patient (or guardian if applicable) is aware that this is a billable service. Verbal consent to proceed has been obtained within the past 12 months. The visit was conducted pursuant to the emergency declaration under the [address] [place] [address] authority and the [employer] General Act. Patient identification was verified, and a caregiver was present when appropriate. The patient was located in a state where the provider was credentialed to provide care.       Magalys Delgado MD  \

## 2021-08-30 NOTE — PROGRESS NOTES
VV-Pt is aware of billable appt depending on insurance plan. Preferred number [phone]    Pt verbally agrees to labs, orders, and others to be mailed to confirmed home address. Identified pt with two pt identifiers(name and ). Reviewed record in preparation for visit and have obtained necessary documentation. All patient medications has been reviewed. Chief Complaint   Patient presents with    Concern For COVID-19 (Coronavirus)     pt is going to get tested at [facility] urgent Care at 4:30pm. Pt states her wife works in a hospital.   [employer] Sinus Infection    Cold    Headache    Diarrhea       Health Maintenance Review: Patient reminded of \"due or due soon\" health maintenance. I have asked the patient to contact his/her primary care provider (PCP) for follow-up on his/her health maintenance.     Patient-Reported Vitals 2021   Patient-Reported Temperature 97.9        Wt Readings from Last 3 Encounters:   21 140 lb (63.5 kg)   21 139 lb 9.6 oz (63.3 kg)   21 140 lb 12.8 oz (63.9 kg)     Temp Readings from Last 3 Encounters:   21 98.5 °F (36.9 °C) (Oral)   21 97.5 °F (36.4 °C) (Temporal)   21 98 °F (36.7 °C) (Temporal)     BP Readings from Last 3 Encounters:   21 102/71   21 101/70   21 117/83     Pulse Readings from Last 3 Encounters:   21 (!) 114   21 (!) 107   21 (!) 110

## 2021-10-14 DIAGNOSIS — F32.A ANXIETY AND DEPRESSION: ICD-10-CM

## 2021-10-14 DIAGNOSIS — F41.9 ANXIETY AND DEPRESSION: ICD-10-CM

## 2021-10-15 NOTE — TELEPHONE ENCOUNTER
Requested Prescriptions     Pending Prescriptions Disp Refills    venlafaxine-SR (EFFEXOR-XR) 75 mg capsule 90 Capsule 1     Sig: TAKE 1 CAPSULE BY MOUTH EVERY DAY       Last virtual visit: 8/30 with Dr Eliecer Loving  Last refill: 2/27/21

## 2021-10-19 RX ORDER — VENLAFAXINE HYDROCHLORIDE 75 MG/1
CAPSULE, EXTENDED RELEASE ORAL
Qty: 90 CAPSULE | Refills: 1 | OUTPATIENT
Start: 2021-10-19

## 2021-11-04 ENCOUNTER — OFFICE VISIT (OUTPATIENT)
Dept: PRIMARY CARE CLINIC | Age: 31
End: 2021-11-04
Payer: COMMERCIAL

## 2021-11-04 VITALS
WEIGHT: 158 LBS | OXYGEN SATURATION: 97 % | DIASTOLIC BLOOD PRESSURE: 70 MMHG | HEART RATE: 108 BPM | HEIGHT: 65 IN | BODY MASS INDEX: 26.33 KG/M2 | TEMPERATURE: 97.5 F | RESPIRATION RATE: 20 BRPM | SYSTOLIC BLOOD PRESSURE: 102 MMHG

## 2021-11-04 DIAGNOSIS — M26.609 TMJ (TEMPOROMANDIBULAR JOINT SYNDROME): Primary | ICD-10-CM

## 2021-11-04 PROCEDURE — 99213 OFFICE O/P EST LOW 20 MIN: CPT | Performed by: INTERNAL MEDICINE

## 2021-11-04 NOTE — PROGRESS NOTES
Chief Complaint   Patient presents with    Ear Pain     left ear pain- unable to chew- started 11/01/21-        Health Maintenance Due   Topic    Hepatitis C Screening     DTaP/Tdap/Td series (2 - Td or Tdap)    Cervical cancer screen     Flu Vaccine (1)        1. Have you been to the ER, urgent care clinic since your last visit? Hospitalized since your last visit? No    2. Have you seen or consulted any other health care providers outside of the [address] [place] [address] since your last visit? Include any pap smears or colon screening. No    There were no vitals taken for this visit.

## 2021-11-04 NOTE — PROGRESS NOTES
Nida Sawyer is a 27 y.o.  female and presents with     Chief Complaint   Patient presents with    Ear Pain     left ear pain- unable to chew- started 11/01/21-      Patient need office appointment as she has been having left ear pain  Denies any sore throat or cough. No discharge from the left ear. On further questioning the patient points out that the pain is actually in front of the left ear over the TM joint  She does have a history of grinding her teeth and used to wear . However recently she got invisalign and stopped using the       Past Medical History:   Diagnosis Date    Depression     Headache      Past Surgical History:   Procedure Laterality Date    HX APPENDECTOMY       Current Outpatient Medications   Medication Sig    venlafaxine-SR (EFFEXOR-XR) 75 mg capsule Take 1 Capsule by mouth daily.  cloNIDine HCL (CATAPRES) 0.1 mg tablet     amphetamine-dextroamphetamine XR (ADDERALL XR) 30 mg XR capsule 25 mg.  Apri 0.15-0.03 mg tab TAKE 1 TABLET BY MOUTH EVERY DAY    Cetirizine (ZYRTEC) 10 mg cap Take  by mouth.  benzonatate (TESSALON) 100 mg capsule Take 1 Capsule by mouth three (3) times daily as needed for Cough. (Patient not taking: Reported on 8/30/2021)    azelastine (ASTELIN) 137 mcg (0.1 %) nasal spray INSTILL 1 SPRAY IN BOTH NOSTRILS TWICE DAILY (Patient not taking: Reported on 6/1/2021)    venlafaxine-SR (EFFEXOR-XR) 75 mg capsule TAKE 1 CAPSULE BY MOUTH EVERY DAY (Patient not taking: Reported on 11/4/2021)    Zipsor 25 mg capsule TAKE 1 CAP BY MOUTH AS NEEDED (MIGRAINE). MAY TAKE ANOTHER CAPSULE 2 HOURS LATER, IF HEADACHE PERSISTS. MAX DOSE 2/DAY. (Patient not taking: Reported on 6/1/2021)    topiramate (TOPAMAX) 25 mg tablet Take 1 Tab by mouth nightly. (Patient not taking: Reported on 6/1/2021)    azelastine (ASTELIN) 137 mcg (0.1 %) nasal spray 1 [place] by Both Nostrils route two (2) times a day.  Use in each nostril as directed (Patient not taking: Reported on 6/1/2021)    famotidine (PEPCID) 20 mg tablet Take 20 mg by mouth two (2) times a day. (Patient not taking: Reported on 6/1/2021)   [place] 0.1-20 mg-mcg tab  (Patient not taking: Reported on 6/3/2021)     No current facility-administered medications for this visit. Health Maintenance   Topic Date Due    Hepatitis C Screening  Never done    DTaP/Tdap/Td series (2 - Td or Tdap) 07/01/2013    Cervical cancer screen  Never done    Flu Vaccine  Completed    COVID-19 Vaccine  Completed    Pneumococcal 0-64 years  Aged [employer] History   Administered Date(s) Administered    COVID-19, J&J, PF, 0.5 mL Dose 04/12/2021    Influenza Vaccine 09/20/2019, 09/07/2021     No LMP recorded. Allergies and Intolerances: Allergies   Allergen Reactions    Ceclor [Cefaclor] Rash    Sulfa (Sulfonamide Antibiotics) Rash       Family History:   History reviewed. No pertinent family history. Social History:   She  reports that she has never smoked. She has never used smokeless tobacco.  She  reports current alcohol use of about 2.0 standard drinks of alcohol per week.             Review of Systems:   General: negative for - chills, fatigue, fever, weight change  Psych: negative for - anxiety, depression, irritability or mood swings  ENT: negative for - headaches, hearing change, nasal congestion, oral lesions, sneezing or sore throat  Heme/ Lymph: negative for - bleeding problems, bruising, pallor or swollen lymph nodes  Endo: negative for - hot flashes, polydipsia/polyuria or temperature intolerance  Resp: negative for - cough, shortness of breath or wheezing  CV: negative for - chest pain, edema or palpitations  GI: negative for - abdominal pain, change in bowel habits, constipation, diarrhea or nausea/vomiting  : negative for - dysuria, hematuria, incontinence, pelvic pain or vulvar/vaginal symptoms  MSK: negative for - joint pain, joint swelling or muscle pain  Neuro: negative for - confusion, headaches, seizures or weakness  Derm: negative for - dry skin, hair changes, rash or skin lesion changes          Physical:   Vitals:   Vitals:    11/04/21 1618   BP: 102/70   Pulse: (!) 108   Resp: 20   Temp: 97.5 °F (36.4 °C)   TempSrc: Temporal   SpO2: 97%   Weight: 158 lb (71.7 kg)   Height: 5' 5\" (1.651 m)           Exam:   HEENT- atraumatic,normocephalic, awake, oriented, well nourished, tenderness over left TM joint, left ear appears normal.  Neck - supple,no enlarged lymph nodes, no JVD, no thyromegaly  Chest- CTA, no rhonchi, no crackles  Heart- rrr, no murmurs / gallop/rub  Abdomen- soft,BS+,NT, no hepatosplenomegaly  Ext - no c/c/edema   Neuro- no focal deficits. Power 5/5 all extremities  Skin - warm,dry, no obvious rashes. Review of Data:   LABS:   Lab Results   Component Value Date/Time    WBC 6.3 07/31/2020 11:10 PM    HGB 12.8 07/31/2020 11:10 PM    HCT 41.5 07/31/2020 11:10 PM    PLATELET 347 51/15/3804 11:10 PM     Lab Results   Component Value Date/Time    Sodium 138 04/09/2021 01:57 PM    Potassium 4.2 04/09/2021 01:57 PM    Chloride 105 04/09/2021 01:57 PM    CO2 29 04/09/2021 01:57 PM    Glucose 101 (H) 04/09/2021 01:57 PM    BUN 11 04/09/2021 01:57 PM    Creatinine 0.82 04/09/2021 01:57 PM     Lab Results   Component Value Date/Time    Cholesterol, total 143 09/16/2019 08:43 AM    HDL Cholesterol 61 09/16/2019 08:43 AM    LDL, calculated 70 09/16/2019 08:43 AM    Triglyceride 62 09/16/2019 08:43 AM     No components found for: GPT        Impression / Plan:        ICD-10-CM ICD-9-CM    1. TMJ (temporomandibular joint syndrome)  M26.609 524.60      Asked patient to take ibuprofen as needed    Start wearing nightguard and follow-up with dentist    Explained to patient risk benefits of the medications. Advised patient to stop meds if having any side effects. Pt verbalized understanding of the instructions.     I have discussed the diagnosis with the patient and the intended plan as seen in the above orders. The patient has received an after-visit summary and questions were answered concerning future plans. I have discussed medication side effects and warnings with the patient as well. I have reviewed the plan of care with the patient, accepted their input and they are in agreement with the treatment goals. Reviewed plan of care. Patient has provided input and agrees with goals. Follow-up and Dispositions    · Return in about 3 months (around 2/4/2022).          Elizabet Gudino MD

## 2022-02-03 DIAGNOSIS — F41.9 ANXIETY: ICD-10-CM

## 2022-02-03 RX ORDER — VENLAFAXINE HYDROCHLORIDE 75 MG/1
75 CAPSULE, EXTENDED RELEASE ORAL DAILY
Qty: 90 CAPSULE | Refills: 0 | OUTPATIENT
Start: 2022-02-03

## 2022-02-03 RX ORDER — VENLAFAXINE HYDROCHLORIDE 75 MG/1
75 CAPSULE, EXTENDED RELEASE ORAL DAILY
Qty: 90 CAPSULE | Refills: 0 | Status: SHIPPED | OUTPATIENT
Start: 2022-02-03 | End: 2022-05-04 | Stop reason: SDUPTHER

## 2022-03-14 ENCOUNTER — PATIENT MESSAGE (OUTPATIENT)
Dept: PRIMARY CARE CLINIC | Age: 32
End: 2022-03-14

## 2022-03-14 ENCOUNTER — TELEPHONE (OUTPATIENT)
Dept: PRIMARY CARE CLINIC | Age: 32
End: 2022-03-14

## 2022-03-15 ENCOUNTER — TELEPHONE (OUTPATIENT)
Dept: PRIMARY CARE CLINIC | Age: 32
End: 2022-03-15

## 2022-03-18 PROBLEM — F98.8 ADD (ATTENTION DEFICIT DISORDER): Status: ACTIVE | Noted: 2021-04-16

## 2022-05-04 DIAGNOSIS — F41.9 ANXIETY: ICD-10-CM

## 2022-05-05 RX ORDER — VENLAFAXINE HYDROCHLORIDE 75 MG/1
75 CAPSULE, EXTENDED RELEASE ORAL DAILY
Qty: 30 CAPSULE | Refills: 0 | Status: SHIPPED | OUTPATIENT
Start: 2022-05-05 | End: 2022-06-04

## 2022-05-17 ENCOUNTER — TELEPHONE (OUTPATIENT)
Dept: PRIMARY CARE CLINIC | Age: 32
End: 2022-05-17

## 2022-05-17 NOTE — TELEPHONE ENCOUNTER
----- Message from Jaime Sonido sent at 5/17/2022 12:00 PM EDT -----  Subject: Message to Provider    QUESTIONS  Information for Provider? Patient calling in stating she use to be on   [employer]ndansepron and she wanted to talk to someone about getting put back on   this. Please advise.   ---------------------------------------------------------------------------  --------------  CALL BACK INFO  What is the best way for the office to contact you? OK to leave message on   voicemail  Preferred Call Back Phone Number? [phone]  ---------------------------------------------------------------------------  --------------  SCRIPT ANSWERS  Relationship to Patient?  Self

## 2022-05-17 NOTE — TELEPHONE ENCOUNTER
Spoke to patient informed she would need an appointment to get back on medication. Patient wants nausea medication as adderall is making her nauseous.  She had some old pills that she took and helped but wanted new script

## 2022-06-10 ENCOUNTER — TELEPHONE (OUTPATIENT)
Dept: PRIMARY CARE CLINIC | Age: 32
End: 2022-06-10

## 2022-06-10 NOTE — TELEPHONE ENCOUNTER
Patient has appointment with Dr Royce Harris on the 23rd of June and needs a medication refill to get her through to that date. She says she gets very dizzy and doesn't feel well when she is not on her medication.       Venlafaxine SR 75mg capsule

## 2022-06-13 RX ORDER — VENLAFAXINE HYDROCHLORIDE 75 MG/1
75 CAPSULE, EXTENDED RELEASE ORAL DAILY
Qty: 30 CAPSULE | Refills: 0 | Status: SHIPPED | OUTPATIENT
Start: 2022-06-13 | End: 2022-06-23 | Stop reason: SDUPTHER

## 2022-06-23 ENCOUNTER — VIRTUAL VISIT (OUTPATIENT)
Dept: PRIMARY CARE CLINIC | Age: 32
End: 2022-06-23
Payer: COMMERCIAL

## 2022-06-23 DIAGNOSIS — F90.9 ATTENTION DEFICIT HYPERACTIVITY DISORDER (ADHD), UNSPECIFIED ADHD TYPE: ICD-10-CM

## 2022-06-23 DIAGNOSIS — F41.1 GAD (GENERALIZED ANXIETY DISORDER): ICD-10-CM

## 2022-06-23 DIAGNOSIS — F32.A DEPRESSION, UNSPECIFIED DEPRESSION TYPE: Primary | ICD-10-CM

## 2022-06-23 PROCEDURE — 99214 OFFICE O/P EST MOD 30 MIN: CPT | Performed by: FAMILY MEDICINE

## 2022-06-23 RX ORDER — METHYLPHENIDATE HYDROCHLORIDE 60 MG/1
CAPSULE ORAL
COMMUNITY
Start: 2022-06-13

## 2022-06-23 RX ORDER — VENLAFAXINE HYDROCHLORIDE 75 MG/1
75 CAPSULE, EXTENDED RELEASE ORAL DAILY
Qty: 90 CAPSULE | Refills: 3 | Status: SHIPPED | OUTPATIENT
Start: 2022-06-23

## 2022-06-23 NOTE — PROGRESS NOTES
Johana Martin  34 y.o. female  1990  [address] [place] [address] [id number]  [id number]   [facility]   Telemedicine Progress Note  Dalton Gordon DO       Encounter Date and Time: June 27, 2022 at 10:37 AM    Consent: Johana Martin, who was seen by synchronous (real-time) audio-video technology, and/or her healthcare decision maker, is aware that this patient-initiated, Telehealth encounter on 6/23/2022 is a billable service, with coverage as determined by her insurance carrier. She is aware that she may receive a bill and has provided verbal consent to proceed: Yes. Chief Complaint   Patient presents with    Medication Refill     History of Present Illness   Johana Martin is a 34 y.o. female was evaluated by synchronous (real-time) audio-video technology from home, through a secure patient portal.    Needs refill of her Effexor. She is working with Dr. Roberto Liz and has been adjusting her ADHD medication. She is currently on Jornay for her ADHD and increased dose to 60mg last night. Had genetic testing to find out which medication would be best for her. Feels mod is stable. Review of Systems   Review of Systems   Respiratory: Negative for shortness of breath. Psychiatric/Behavioral: Positive for depression. Negative for suicidal ideas. The patient is nervous/anxious. Endorses inattention     Vitals/Objective:     General: alert, cooperative, no distress   Mental  status: mental status: alert, oriented to person, place, and time, normal mood, behavior, speech, dress, motor activity, and thought processes   Resp: resp: normal effort and no respiratory distress   Neuro: neuro: no gross deficits   Skin: skin: no discoloration or lesions of concern on visible areas   Due to this being a TeleHealth evaluation, many elements of the physical examination are unable to be assessed.      Assessment and Plan:   Time-based coding, delete if not needed: I spent at least 5 minutes with this established patient, and >50% of the time was spent counseling and/or coordinating care regarding anxiety, depression, adhd    1. Depression, unspecified depression type  Stable. Continue current regimen. - venlafaxine-SR (EFFEXOR-XR) 75 mg capsule; Take 1 Capsule by mouth daily. Dispense: 90 Capsule; Refill: 3    2. GINA (generalized anxiety disorder)  - venlafaxine-SR (EFFEXOR-XR) 75 mg capsule; Take 1 Capsule by mouth daily. Dispense: 90 Capsule; Refill: 3    3. Attention deficit hyperactivity disorder (ADHD), unspecified ADHD type  Continue to adjust with Psychiatry. After stable can adjust Effexor PRN. - Jornay PM 60 mg CDES; TAKE TAKE 1 CAPSULE BY MOUTH EVERY DAY AT NIGHT    Time spent in direct conversation with the patient to include medical condition(s) discussed, assessment and treatment plan: 5 min    We discussed the expected course, resolution and complications of the diagnosis(es) in detail. Medication risks, benefits, costs, interactions, and alternatives were discussed as indicated. I advised her to contact the office if her condition worsens, changes or fails to improve as anticipated. She expressed understanding with the diagnosis(es) and plan. Patient understands that this encounter was a temporary measure, and the importance of further follow up and examination was emphasized. Patient verbalized understanding. Electronically Signed: DO Jazmin Harpchloe Escobar is a 34 y.o. female who was evaluated by an audio-video encounter for concerns as above. Patient identification was verified prior to start of the visit. A caregiver was present when appropriate. Due to this being a TeleHealth encounter (During QJFKX-40 public health emergency), evaluation of the following organ systems was limited: Vitals/Constitutional/EENT/Resp/CV/GI//MS/Neuro/Skin/Heme-Lymph-Imm.   Pursuant to the emergency declaration under the [facility], 1135 waiver authority and the Coronavirus Preparedness and Response Supplemental Appropriations Act, this Virtual Visit was conducted, with patient's (and/or legal guardian's) consent, to reduce the patient's risk of exposure to COVID-19 and provide necessary medical care. Services were provided through a synchronous discussion virtually to substitute for in-person clinic visit. I was in the office. The patient was at home. History   Patients past medical, surgical and family histories were reviewed and updated. Past Medical History:   Diagnosis Date    Depression     Headache      Past Surgical History:   Procedure Laterality Date    HX APPENDECTOMY       History reviewed. No pertinent family history. Social History     Socioeconomic History    Marital status:      Spouse name: Not on file    Number of children: Not on file    Years of education: Not on file    Highest education level: Not on file   Occupational History    Not on file   Tobacco Use    Smoking status: Never Smoker    Smokeless tobacco: Never Used   Vaping Use    Vaping Use: Never used   Substance and Sexual Activity    Alcohol use: Yes     Alcohol/week: 2.0 standard drinks     Types: 2 Glasses of wine per week     Comment: rarely    Drug use: Never    Sexual activity: Yes     Partners: Female     Birth control/protection: None   Other Topics Concern    Not on file   Social History Narrative    Not on file     Social Determinants of Health     Financial Resource Strain:     Difficulty of Paying Living Expenses: Not on file   Food Insecurity:     Worried About Running Out of Food in the Last Year: Not on file    Fred of Food in the Last Year: Not on file   Transportation Needs:     Lack of Transportation (Medical): Not on file    Lack of Transportation (Non-Medical):  Not on file   Physical Activity:     Days of Exercise per Week: Not on file    Minutes of Exercise per Session: Not on file   Stress:     Feeling of Stress : Not on file   Social Connections:     Frequency of Communication with Friends and Family: Not on file    Frequency of Social Gatherings with Friends and Family: Not on file    Attends Hindu Services: Not on file    Active Member of Clubs or Organizations: Not on file    Attends Club or Organization Meetings: Not on file    Marital Status: Not on file   Intimate Partner Violence:     Fear of Current or Ex-Partner: Not on file    Emotionally Abused: Not on file    Physically Abused: Not on file    Sexually Abused: Not on file   Housing Stability:     Unable to Pay for Housing in the Last Year: Not on file    Number of [place] in the Last Year: Not on file    Unstable Housing in the Last Year: Not on file     Patient Active Problem List   Diagnosis Code    ADD (attention deficit disorder) F98.8          Current Medications/Allergies   Medications and Allergies reviewed:    Current Outpatient Medications   Medication Sig Dispense Refill    Jornay PM 60 mg CDES TAKE TAKE 1 CAPSULE BY MOUTH EVERY DAY AT NIGHT      venlafaxine-SR (EFFEXOR-XR) 75 mg capsule Take 1 Capsule by mouth daily. 90 Capsule 3    Apri 0.15-0.03 mg tab TAKE 1 TABLET BY MOUTH EVERY DAY      Cetirizine (ZYRTEC) 10 mg cap Take  by mouth.  cloNIDine HCL (CATAPRES) 0.1 mg tablet  (Patient not taking: Reported on 6/23/2022)      benzonatate (TESSALON) 100 mg capsule Take 1 Capsule by mouth three (3) times daily as needed for Cough. (Patient not taking: Reported on 8/30/2021) 15 Capsule 0    azelastine (ASTELIN) 137 mcg (0.1 %) nasal spray INSTILL 1 SPRAY IN BOTH NOSTRILS TWICE DAILY (Patient not taking: Reported on 6/1/2021) 1 Bottle 1    Zipsor 25 mg capsule TAKE 1 CAP BY MOUTH AS NEEDED (MIGRAINE). MAY TAKE ANOTHER CAPSULE 2 HOURS LATER, IF HEADACHE PERSISTS. MAX DOSE 2/DAY. (Patient not taking: Reported on 6/1/2021) 30 Cap 1    topiramate (TOPAMAX) 25 mg tablet Take 1 Tab by mouth nightly.  (Patient not taking: Reported on 6/1/2021) 30 Tab 5    azelastine (ASTELIN) 137 mcg (0.1 %) nasal spray 1 [place] by Both Nostrils route two (2) times a day. Use in each nostril as directed (Patient not taking: Reported on 6/23/2022) 1 Bottle 0    famotidine (PEPCID) 20 mg tablet Take 20 mg by mouth two (2) times a day.  (Patient not taking: Reported on 6/1/2021)     Loraine Silence 0.1-20 mg-mcg tab  (Patient not taking: Reported on 6/3/2021)       Allergies   Allergen Reactions    Ceclor [Cefaclor] Rash    Sulfa (Sulfonamide Antibiotics) Rash

## 2022-06-23 NOTE — PROGRESS NOTES
Identified pt with two pt identifiers(name and ). Chief Complaint   Patient presents with    Medication Refill        3 most recent PHQ Screens 2021   PHQ Not Done -   Little interest or pleasure in doing things Not at all   Feeling down, depressed, irritable, or hopeless Not at all   Total Score PHQ 2 0        There were no vitals filed for this visit. Health Maintenance Due   Topic Date Due    Hepatitis C Screening  Never done    DTaP/Tdap/Td series (2 - Td or Tdap) 2013    Cervical cancer screen  Never done    COVID-19 Vaccine (2 - Booster for Ye series) 2021        1. Have you been to the ER, urgent care clinic since your last visit? Hospitalized since your last visit? No    2. Have you seen or consulted any other health care providers outside of the [address] [place] [address] since your last visit? Include any pap smears or colon screening. No    3. For patients aged 39-70: Has the patient had a colonoscopy / FIT/ Cologuard? NA - based on age      If the patient is female:    4. For patients aged 41-77: Has the patient had a mammogram within the past 2 years? NA - based on age or sex      11. For patients aged 21-65: Has the patient had a pap smear?  No

## 2022-12-07 ENCOUNTER — OFFICE VISIT (OUTPATIENT)
Dept: PRIMARY CARE CLINIC | Age: 32
End: 2022-12-07

## 2022-12-07 VITALS
RESPIRATION RATE: 18 BRPM | WEIGHT: 137 LBS | DIASTOLIC BLOOD PRESSURE: 75 MMHG | TEMPERATURE: 97.5 F | HEIGHT: 65 IN | SYSTOLIC BLOOD PRESSURE: 105 MMHG | BODY MASS INDEX: 22.82 KG/M2 | HEART RATE: 120 BPM | OXYGEN SATURATION: 100 %

## 2022-12-07 DIAGNOSIS — M26.609 TEMPORAL MANDIBULAR JOINT DISORDER: ICD-10-CM

## 2022-12-07 DIAGNOSIS — J01.91 ACUTE RECURRENT SINUSITIS, UNSPECIFIED LOCATION: Primary | ICD-10-CM

## 2022-12-07 DIAGNOSIS — G43.709 CHRONIC MIGRAINE WITHOUT AURA WITHOUT STATUS MIGRAINOSUS, NOT INTRACTABLE: ICD-10-CM

## 2022-12-07 DIAGNOSIS — J34.89 SINUS PRESSURE: ICD-10-CM

## 2022-12-07 LAB
FLUAV+FLUBV AG NOSE QL IA.RAPID: NEGATIVE
FLUAV+FLUBV AG NOSE QL IA.RAPID: NEGATIVE
VALID INTERNAL CONTROL?: YES

## 2022-12-07 RX ORDER — AMOXICILLIN AND CLAVULANATE POTASSIUM 875; 125 MG/1; MG/1
1 TABLET, FILM COATED ORAL 2 TIMES DAILY
Qty: 14 TABLET | Refills: 0 | Status: SHIPPED | OUTPATIENT
Start: 2022-12-07 | End: 2022-12-14

## 2022-12-07 NOTE — PROGRESS NOTES
Chief Complaint   Patient presents with    Migraine    Nasal Congestion     Has been going on for a while now. Patient stated would get better, then worse again. Started back up on  night, states she is having sinus pressure, pain, and runny nose. Left eye/head pain as well. Took Covid test last week. Identified pt with two pt identifiers(name and ). Chief Complaint   Patient presents with    Migraine    Nasal Congestion     Has been going on for a while now. Patient stated would get better, then worse again. Started back up on  night, states she is having sinus pressure, pain, and runny nose. Left eye/head pain as well. Took Covid test last week. 3 most recent PHQ Screens 2022   PHQ Not Done -   Little interest or pleasure in doing things Not at all   Feeling down, depressed, irritable, or hopeless Not at all   Total Score PHQ 2 0        There were no vitals filed for this visit. Health Maintenance Due   Topic Date Due    Hepatitis C Screening  Never done    DTaP/Tdap/Td series (2 - Td or Tdap) 2013    Cervical cancer screen  Never done    COVID-19 Vaccine (2 - Booster for Ye series) 2021    Flu Vaccine (1) 2022    Depression Screen  2022        1. Have you been to the ER, urgent care clinic since your last visit? Hospitalized since your last visit? No    2. Have you seen or consulted any other health care providers outside of the [address] [place] [address] since your last visit? Include any pap smears or colon screening. No    3. For patients aged 39-70: Has the patient had a colonoscopy / FIT/ Cologuard? NA - based on age      If the patient is female:    4. For patients aged 41-77: Has the patient had a mammogram within the past 2 years? NA - based on age or sex      11. For patients aged 21-65: Has the patient had a pap smear?  Yes - no Care [place] present

## 2022-12-07 NOTE — PROGRESS NOTES
HPI     Chief Complaint   Patient presents with    Migraine    Nasal Congestion     Has been going on for a while now. Patient stated would get better, then worse again. Started back up on Sunday night, states she is having sinus pressure, pain, and runny nose. Left eye/head pain as well. Took Covid test last week. She is a 34 y.o. female who presents for nasal congestion/ migraine. States she has had nasal congestion, sneezing, runny nose x2 months. States she has been taking Sudafed on and off. Has not taken anything in past week. Sunday night she started to get pain beside her R eye. States she is having pain in her R ear. No difficulty moving her eyes. No swelling around the eye. No fever, chills. No pain around teeth. States she has a hx of headaches since childhood. She has seen Neuro and had MRIs before. States she always has pain on L side behind the eye. Gets sensitive to light and will get nauseated. Saw Neuro in 2020 who gave her medicines to try but would like to see them again. She states she knows certain things trigger it like cheese and wine. She would like a referral to Neurology because migraines have been occurring more frequently recently. She has not had more then 8 headache days a month. No migraines in last week. Gets sensitive to light but not losing vision. She also has a hx of TMJ and is doing invisalign but has been doing it extra long because it has been giving her headaches. Review of Systems   Constitutional:  Negative for chills and fever. HENT:  Positive for sinus pressure and sinus pain. Reviewed PmHx, RxHx, FmHx, SocHx, AllgHx and updated and dated in the chart.     Physical Exam:  Visit Vitals  /75 (BP 1 Location: Right upper arm, BP Patient Position: Sitting, BP Cuff Size: Adult)   Pulse (!) 120   Temp 97.5 °F (36.4 °C) (Temporal)   Resp 18   Ht 5' 5\" (1.651 m)   Wt 137 lb (62.1 kg)   SpO2 100%   BMI 22.80 kg/m²   Patient is tachycardic at baseline. Physical Exam  Vitals and nursing note reviewed. Constitutional:       General: She is not in acute distress. Appearance: Normal appearance. She is not ill-appearing. HENT:      Head:      Comments: Tenderness over R ethmoid sinus. Right Ear: Tympanic membrane normal.      Left Ear: Tympanic membrane normal.      Nose: No rhinorrhea. Mouth/Throat:      Pharynx: Posterior oropharyngeal erythema present. Comments: Cobblestoning mucosa. Eyes:      Extraocular Movements: Extraocular movements intact. Cardiovascular:      Rate and Rhythm: Regular rhythm. Tachycardia present. Heart sounds: No murmur heard. Pulmonary:      Effort: Pulmonary effort is normal. No respiratory distress. Breath sounds: Normal breath sounds. Neurological:      General: No focal deficit present. Mental Status: She is alert. Mental status is at baseline. Cranial Nerves: No cranial nerve deficit. Motor: No weakness. Coordination: Coordination normal.      Gait: Gait normal.      Comments: Neuro exam nml  Tms WNL  Cobblestoning mucosa  Tenderness over R ethmoid sinus   EOM intact   Psychiatric:         Mood and Affect: Mood normal.         Behavior: Behavior normal.     POC Flu neg  No results found for this or any previous visit (from the past 12 hour(s)). Assessment / Plan     Diagnoses and all orders for this visit:    1. Acute recurrent sinusitis, unspecified location  -     amoxicillin-clavulanate (AUGMENTIN) 875-125 mg per tablet; Take 1 Tablet by mouth two (2) times a day for 7 days.  -     REFERRAL TO ENT-OTOLARYNGOLOGY    2. Temporal mandibular joint disorder  -     REFERRAL TO ENT-OTOLARYNGOLOGY    3. Chronic migraine without aura without status migrainosus, not intractable - chronic issue that has worsened. -     REFERRAL TO NEUROLOGY  -     rimegepant (NURTEC) 75 mg disintegrating tablet; 75 mg as a single dose for moderate to severe migraine.  Maximum: 75 mg per 24 hours    4. Sinus pressure  -     AMB POC AMBER INFLUENZA A/B TEST     Patient has already tried Nurtec but states she didn't think she gave it long enough and would like to try again. Given referral to Neuro. Upon chart review she had some GI upset with Amox in the past but did tolerate it. She states she will take a probiotic but would like to take the Augmentin and will call back if she has any issues. Discussed it may be beneficial to see ENT as she has been having chronic sinus symptoms yearly. I have discussed the diagnosis with the patient and the intended plan as seen in the above orders. The patient has received an after-visit summary and questions were answered concerning future plans. I have discussed medication side effects and warnings with the patient as well.     Ayden Ly, DO

## 2023-02-02 ENCOUNTER — TELEPHONE (OUTPATIENT)
Dept: PRIMARY CARE CLINIC | Age: 33
End: 2023-02-02

## 2023-04-06 ENCOUNTER — TELEPHONE (OUTPATIENT)
Dept: PRIMARY CARE CLINIC | Age: 33
End: 2023-04-06

## 2023-05-29 RX ORDER — VENLAFAXINE HYDROCHLORIDE 75 MG/1
75 CAPSULE, EXTENDED RELEASE ORAL DAILY
COMMUNITY
Start: 2022-06-23

## 2023-05-29 RX ORDER — METHYLPHENIDATE HYDROCHLORIDE 60 MG/1
CAPSULE ORAL
COMMUNITY
Start: 2022-06-13

## 2023-09-05 ENCOUNTER — TELEPHONE (OUTPATIENT)
Age: 33
End: 2023-09-05

## 2023-09-14 ENCOUNTER — OFFICE VISIT (OUTPATIENT)
Dept: PRIMARY CARE CLINIC | Facility: CLINIC | Age: 33
End: 2023-09-14
Payer: COMMERCIAL

## 2023-09-14 VITALS
SYSTOLIC BLOOD PRESSURE: 107 MMHG | RESPIRATION RATE: 16 BRPM | HEIGHT: 65 IN | OXYGEN SATURATION: 99 % | DIASTOLIC BLOOD PRESSURE: 74 MMHG | HEART RATE: 108 BPM | BODY MASS INDEX: 22.36 KG/M2 | WEIGHT: 134.2 LBS | TEMPERATURE: 97.1 F

## 2023-09-14 DIAGNOSIS — Z23 NEED FOR IMMUNIZATION AGAINST INFLUENZA: ICD-10-CM

## 2023-09-14 DIAGNOSIS — J02.9 SORE THROAT: Primary | ICD-10-CM

## 2023-09-14 PROCEDURE — 90471 IMMUNIZATION ADMIN: CPT | Performed by: FAMILY MEDICINE

## 2023-09-14 PROCEDURE — 99213 OFFICE O/P EST LOW 20 MIN: CPT | Performed by: FAMILY MEDICINE

## 2023-09-14 PROCEDURE — 90674 CCIIV4 VAC NO PRSV 0.5 ML IM: CPT | Performed by: FAMILY MEDICINE

## 2023-09-14 RX ORDER — FLUTICASONE PROPIONATE 50 MCG
1 SPRAY, SUSPENSION (ML) NASAL DAILY
Qty: 32 G | Refills: 1 | Status: SHIPPED | OUTPATIENT
Start: 2023-09-14

## 2023-09-14 RX ORDER — AMOXICILLIN 500 MG/1
500 CAPSULE ORAL 2 TIMES DAILY
Qty: 14 CAPSULE | Refills: 0 | Status: SHIPPED | OUTPATIENT
Start: 2023-09-14 | End: 2023-09-21

## 2023-09-14 ASSESSMENT — PATIENT HEALTH QUESTIONNAIRE - PHQ9
1. LITTLE INTEREST OR PLEASURE IN DOING THINGS: 0
2. FEELING DOWN, DEPRESSED OR HOPELESS: 0
SUM OF ALL RESPONSES TO PHQ QUESTIONS 1-9: 0
SUM OF ALL RESPONSES TO PHQ QUESTIONS 1-9: 0
SUM OF ALL RESPONSES TO PHQ9 QUESTIONS 1 & 2: 0
SUM OF ALL RESPONSES TO PHQ QUESTIONS 1-9: 0
SUM OF ALL RESPONSES TO PHQ QUESTIONS 1-9: 0

## 2024-02-05 ENCOUNTER — OFFICE VISIT (OUTPATIENT)
Dept: PRIMARY CARE CLINIC | Facility: CLINIC | Age: 34
End: 2024-02-05
Payer: COMMERCIAL

## 2024-02-05 VITALS
SYSTOLIC BLOOD PRESSURE: 129 MMHG | DIASTOLIC BLOOD PRESSURE: 84 MMHG | BODY MASS INDEX: 22.66 KG/M2 | HEART RATE: 88 BPM | RESPIRATION RATE: 17 BRPM | HEIGHT: 65 IN | TEMPERATURE: 98.5 F | OXYGEN SATURATION: 95 % | WEIGHT: 136 LBS

## 2024-02-05 DIAGNOSIS — F41.9 ANXIETY AND DEPRESSION: ICD-10-CM

## 2024-02-05 DIAGNOSIS — M65.4 DE QUERVAIN'S TENOSYNOVITIS, LEFT: Primary | ICD-10-CM

## 2024-02-05 DIAGNOSIS — Z01.818 PRE-OP EXAM: ICD-10-CM

## 2024-02-05 DIAGNOSIS — F98.8 ATTENTION DEFICIT DISORDER, UNSPECIFIED HYPERACTIVITY PRESENCE: ICD-10-CM

## 2024-02-05 DIAGNOSIS — F32.A ANXIETY AND DEPRESSION: ICD-10-CM

## 2024-02-05 LAB
ALBUMIN SERPL-MCNC: 3.8 G/DL (ref 3.5–5)
ALBUMIN/GLOB SERPL: 1.2 (ref 1.1–2.2)
ALP SERPL-CCNC: 80 U/L (ref 45–117)
ALT SERPL-CCNC: 15 U/L (ref 12–78)
ANION GAP SERPL CALC-SCNC: 5 MMOL/L (ref 5–15)
AST SERPL-CCNC: 14 U/L (ref 15–37)
BILIRUB SERPL-MCNC: 0.5 MG/DL (ref 0.2–1)
BUN SERPL-MCNC: 10 MG/DL (ref 6–20)
BUN/CREAT SERPL: 16 (ref 12–20)
CALCIUM SERPL-MCNC: 8.8 MG/DL (ref 8.5–10.1)
CHLORIDE SERPL-SCNC: 102 MMOL/L (ref 97–108)
CO2 SERPL-SCNC: 29 MMOL/L (ref 21–32)
CREAT SERPL-MCNC: 0.64 MG/DL (ref 0.55–1.02)
ERYTHROCYTE [DISTWIDTH] IN BLOOD BY AUTOMATED COUNT: 12.1 % (ref 11.5–14.5)
GLOBULIN SER CALC-MCNC: 3.1 G/DL (ref 2–4)
GLUCOSE SERPL-MCNC: 115 MG/DL (ref 65–100)
HCT VFR BLD AUTO: 39.4 % (ref 35–47)
HGB BLD-MCNC: 12.5 G/DL (ref 11.5–16)
MCH RBC QN AUTO: 27.2 PG (ref 26–34)
MCHC RBC AUTO-ENTMCNC: 31.7 G/DL (ref 30–36.5)
NRBC # BLD: 0 K/UL (ref 0–0.01)
NRBC BLD-RTO: 0 PER 100 WBC
PLATELET # BLD AUTO: 271 K/UL (ref 150–400)
PMV BLD AUTO: 9.6 FL (ref 8.9–12.9)
POTASSIUM SERPL-SCNC: 3.6 MMOL/L (ref 3.5–5.1)
PROT SERPL-MCNC: 6.9 G/DL (ref 6.4–8.2)
RBC # BLD AUTO: 4.59 M/UL (ref 3.8–5.2)
SODIUM SERPL-SCNC: 136 MMOL/L (ref 136–145)
WBC # BLD AUTO: 7.1 K/UL (ref 3.6–11)

## 2024-02-05 PROCEDURE — 99214 OFFICE O/P EST MOD 30 MIN: CPT | Performed by: FAMILY MEDICINE

## 2024-02-05 ASSESSMENT — PATIENT HEALTH QUESTIONNAIRE - PHQ9
2. FEELING DOWN, DEPRESSED OR HOPELESS: 0
SUM OF ALL RESPONSES TO PHQ9 QUESTIONS 1 & 2: 0
1. LITTLE INTEREST OR PLEASURE IN DOING THINGS: 0
SUM OF ALL RESPONSES TO PHQ QUESTIONS 1-9: 0

## 2024-02-05 NOTE — PROGRESS NOTES
\"Have you been to the ER, urgent care clinic since your last visit?  Hospitalized since your last visit?\"    NO    “Have you seen or consulted any other health care providers outside of [facility] since your last visit?”    NO        “Have you had a pap smear?”    YES - Where: [facility]  Nurse/ANNE to request most recent records if not in the chart

## 2024-02-05 NOTE — PROGRESS NOTES
Preoperative Evaluation    Date of Exam: 2024    Maite Scanlon is a 33 y.o. female (:1990) who presents for preoperative evaluation.     Procedure/Surgery: L incision extensor tendon sheath wrist    Date of Procedure/Surgery: 24    Surgeon: Dr. Martin    [facility]urgical Facility: [facility] outpatient [facility]    Primary Physician: Caridad Kirkpatrick DO    Latex Allergy: no    Recent use of: No recent use of aspirin (ASA), NSAIDS or steroids. Last NSAID use on Saturday.     Tetanus up to date: tetanus status unknown to the patient, thinks she had one in last 10 years    Anesthesia Complications: None    History of abnormal bleeding : None    History of Blood Transfusions: no    Health Care Directive or Living Will: no    Is able to climb a flight of stairs without chest pain or severe SOB    PMH notable for ADD, anxiety and depression. Will ask prior to procedure if she needs to hold her ADD meds. She is followed by [facility].     Allergies- reviewed:   Allergies   Allergen Reactions    Cefaclor Rash    Sulfa Antibiotics Rash         Medications- reviewed:   Current Outpatient Medications   Medication Sig    fluticasone (FLONASE) 50 MCG/ACT nasal spray 1 spray by Each Nostril route daily    Cetirizine HCl 10 MG CAPS Take by mouth    Methylphenidate HCl ER, PM, (JORNAY PM) 60 MG CP24 TAKE TAKE 1 CAPSULE BY MOUTH EVERY DAY AT NIGHT    venlafaxine (EFFEXOR XR) 75 MG extended release capsule Take 2 capsules by mouth daily     No current facility-administered medications for this visit.         Past Medical History- reviewed:  Past Medical History:   Diagnosis Date    Depression     Headache          Past Surgical History- reviewed:   Past Surgical History:   Procedure Laterality Date    APPENDECTOMY           Social History- reviewed:  Social History     Socioeconomic History    Marital status:      Spouse name: Not on file    Number of children: Not on file    Years of education: Not on file

## 2024-06-27 ENCOUNTER — TELEPHONE (OUTPATIENT)
Dept: PRIMARY CARE CLINIC | Facility: CLINIC | Age: 34
End: 2024-06-27

## 2024-06-27 NOTE — TELEPHONE ENCOUNTER
Patient called to report that she has been sick since Tuesday and has been having fever and tested positive for COVID on Tuesday.  She states she called the office but could not get an appointment.  I do not find any documentation that the patient called the office  sHe feels she also has strep throat and she noticed a white dot on her tonsils.  Asked patient to go to urgent care to be evaluated.  Inform patient that I cannot prescribe without actually seeing her  Patient agreed to do so

## 2024-10-24 ENCOUNTER — TELEPHONE (OUTPATIENT)
Dept: PRIMARY CARE CLINIC | Facility: CLINIC | Age: 34
End: 2024-10-24

## 2024-10-24 NOTE — TELEPHONE ENCOUNTER
----- Message from Marcela AGUILAR sent at 10/23/2024  2:44 PM EDT -----  Regarding: E[facility] Appointment Request  E[facility] Appointment Request    Patient needs appointment for E[facility] Appointment Type: Existing Condition Follow Up.    Patient Requested Dates(s): As soon as possible.  Patient Requested Time: Any time.   Provider Name: Caridad Kirkpatrick DO    Reason for Appointment Request: Established Patient - Available appointments did not meet patient need. The patient had been exchanging text to a nurse for her to have an appointment. The patient supposed to have one today, but no appointment was book for her, so she is requesting to have an soonest appointment available. Virtual Appointment is what she want.  --------------------------------------------------------------------------------------------------------------------------    Relationship to Patient: Self     Call Back Information: OK to leave message on voicemail  Preferred Call Back Number: Phone: [phone]

## 2024-10-24 NOTE — TELEPHONE ENCOUNTER
Patient and I were messaging through My chart. She did not respond back to my last message and she apologized for that. I called her back and she said that she didn't check her my chart and that she would like to have the earliest virtual appointment.     I saw availability November 5th, which times are you okay with?

## 2024-10-25 NOTE — TELEPHONE ENCOUNTER
Tried reaching the patient. Scheduled the patient for 11/6 at 1pm. Left a voicemail to call us back if this is not do able

## 2024-10-28 ENCOUNTER — TELEMEDICINE (OUTPATIENT)
Dept: PRIMARY CARE CLINIC | Facility: CLINIC | Age: 34
End: 2024-10-28
Payer: COMMERCIAL

## 2024-10-28 DIAGNOSIS — G43.709 CHRONIC MIGRAINE WITHOUT AURA WITHOUT STATUS MIGRAINOSUS, NOT INTRACTABLE: Primary | ICD-10-CM

## 2024-10-28 PROCEDURE — 99213 OFFICE O/P EST LOW 20 MIN: CPT | Performed by: FAMILY MEDICINE

## 2024-10-28 RX ORDER — CETIRIZINE HYDROCHLORIDE 10 MG/1
10 TABLET ORAL DAILY
COMMUNITY

## 2024-10-28 NOTE — PROGRESS NOTES
Glucose 02/05/2024 115 (H)  65 - 100 mg/dL Final    BUN 02/05/2024 10  6 - 20 MG/DL Final    Creatinine 02/05/2024 0.64  0.55 - 1.02 MG/DL Final    BUN/Creatinine Ratio 02/05/2024 16  12 - 20   Final    Est, Glom Filt Rate 02/05/2024 >60  >60 ml/min/1.73m2 Final    Comment:   Pediatric calculator link: https://www.kidney.org/professionals/kdoqi/gfr_calculatorped    These results are not intended for use in patients <18 years of age.    eGFR results are calculated without a race factor using  the 2021 CKD-EPI equation. Careful clinical correlation is recommended, particularly when comparing to results calculated using previous equations.  The CKD-EPI equation is less accurate in patients with extremes of muscle mass, extra-renal metabolism of creatinine, excessive creatine ingestion, or following therapy that affects renal tubular secretion.      Calcium 02/05/2024 8.8  8.5 - 10.1 MG/DL Final    Total Bilirubin 02/05/2024 0.5  0.2 - 1.0 MG/DL Final    ALT 02/05/2024 15  12 - 78 U/L Final    AST 02/05/2024 14 (L)  15 - 37 U/L Final    Alk Phosphatase 02/05/2024 80  45 - 117 U/L Final    Total Protein 02/05/2024 6.9  6.4 - 8.2 g/dL Final    Albumin 02/05/2024 3.8  3.5 - 5.0 g/dL Final    Globulin 02/05/2024 3.1  2.0 - 4.0 g/dL Final    Albumin/Globulin Ratio 02/05/2024 1.2  1.1 - 2.2   Final    WBC 02/05/2024 7.1  3.6 - 11.0 K/uL Final    RBC 02/05/2024 4.59  3.80 - 5.20 M/uL Final    Hemoglobin 02/05/2024 12.5  11.5 - 16.0 g/dL Final    Hematocrit 02/05/2024 39.4  35.0 - 47.0 % Final    MCV 02/05/2024 85.8  80.0 - 99.0 FL Final    MCH 02/05/2024 27.2  26.0 - 34.0 PG Final    MCHC 02/05/2024 31.7  30.0 - 36.5 g/dL Final    RDW 02/05/2024 12.1  11.5 - 14.5 % Final    Platelets 02/05/2024 271  150 - 400 K/uL Final    MPV 02/05/2024 9.6  8.9 - 12.9 FL Final    Nucleated RBCs 02/05/2024 0.0  0  WBC Final    nRBC 02/05/2024 0.00  0.00 - 0.01 K/uL Final            Objective   Patient-Reported Vitals  No data recorded

## 2024-10-29 ENCOUNTER — TELEPHONE (OUTPATIENT)
Dept: PRIMARY CARE CLINIC | Facility: CLINIC | Age: 34
End: 2024-10-29

## 2024-10-29 NOTE — TELEPHONE ENCOUNTER
PA for Nurtec Denied by insurance. Per Insurance - We denied your request because we did not see what we need to approve the drug you  asked for, (Nurtec ODT 75 milligrams). We may be able to approve this drug when we  see certain records (records you tried and did not respond or tolerate two preferred  drugs [oral triptans; the preferred oral triptans are: almotriptan, eletriptan (generic  Relpax), naratriptan (generic Amerge), rizatriptan/rizatriptan orally disintegrating tablet  (ODT) (generic Maxalt/Maxalt MLT), sumatriptan (generic Imitrex), and  zolmitriptan/zolmitriptan ODT (generic Zomig/Zomig ZMT) (some of the listed drugs may  require prior authorization)]). We based this decision on your health plan’s prior  
Statement Selected

## 2024-11-06 ENCOUNTER — TELEMEDICINE (OUTPATIENT)
Dept: PRIMARY CARE CLINIC | Facility: CLINIC | Age: 34
End: 2024-11-06
Payer: COMMERCIAL

## 2024-11-06 DIAGNOSIS — G43.709 CHRONIC MIGRAINE WITHOUT AURA WITHOUT STATUS MIGRAINOSUS, NOT INTRACTABLE: Primary | ICD-10-CM

## 2024-11-06 PROCEDURE — 99213 OFFICE O/P EST LOW 20 MIN: CPT | Performed by: FAMILY MEDICINE

## 2024-11-06 RX ORDER — UBROGEPANT 50 MG/1
TABLET ORAL
Qty: 16 TABLET | Refills: 5 | Status: SHIPPED | OUTPATIENT
Start: 2024-11-06

## 2024-11-06 RX ORDER — SUMATRIPTAN 50 MG/1
TABLET, FILM COATED ORAL
Qty: 9 TABLET | Refills: 5 | Status: SHIPPED | OUTPATIENT
Start: 2024-11-06

## 2024-11-06 NOTE — PROGRESS NOTES
Maite Scanlon (:  1990) is a Established patient, here for evaluation of the following:  Migraine      Assessment & Plan   Below is the assessment and plan developed based on review of pertinent history, physical exam, labs, studies, and medications.  1. Chronic migraine without aura without status migrainosus, not intractable  -     SUMAtriptan (IMITREX) 50 MG tablet; Take 1 tab PO PRN migraine. If symptoms persists after 2 hours take 2nd tab. Do not exceed 2 tabs in 24 hours., Disp-9 tablet, R-5Normal  -     Ubrogepant (UBRELVY) 50 MG TABS; Take 1 tab PO PRN migraine. If symptoms persists after 2 hours take 2nd tab. Do not exceed 2 tabs in 24 hours., Disp-16 tablet, R-5Normal    Will send in triptan and Ubrelvy. Discussed side effects of medicines including nausea, dizziness, fatigue, chest pain/ pressure, etc. If symptoms fail to improve or has adverse effects reach out to us for other treatment options.          Subjective   HPI    22:  States she has a hx of headaches since childhood. She has seen Neuro and had MRIs before. States she always has pain on L side behind the eye. Gets sensitive to light and will get nauseated. Saw Neuro in  who gave her medicines to try but would like to see them again. She states she knows certain things trigger it like cheese and wine. She would like a referral to Neurology because migraines have been occurring more frequently recently. She has not had more then 8 headache days a month. No migraines in last week. Gets sensitive to light but not losing vision.      10/28/24  Tried Nurtec in past and worked well for her for her migraines. Her insurance did not cover the Rx after the coupon she used last time but she would like to see if she can get this covered now since it worked so well for her. Her migraines have not increased but she goes through phases where she will have a couple in a month and then none for a few months. Feels she knows her triggers.      Has

## 2024-11-18 ENCOUNTER — OFFICE VISIT (OUTPATIENT)
Dept: PRIMARY CARE CLINIC | Facility: CLINIC | Age: 34
End: 2024-11-18
Payer: COMMERCIAL

## 2024-11-18 VITALS
HEART RATE: 121 BPM | SYSTOLIC BLOOD PRESSURE: 109 MMHG | HEIGHT: 65 IN | TEMPERATURE: 97.3 F | WEIGHT: 143.2 LBS | DIASTOLIC BLOOD PRESSURE: 72 MMHG | RESPIRATION RATE: 16 BRPM | OXYGEN SATURATION: 97 % | BODY MASS INDEX: 23.86 KG/M2

## 2024-11-18 DIAGNOSIS — R05.1 ACUTE COUGH: Primary | ICD-10-CM

## 2024-11-18 DIAGNOSIS — J40 BRONCHITIS: ICD-10-CM

## 2024-11-18 LAB
LOT EXPIRE DATE: NORMAL
LOT KIT NUMBER: NORMAL
QUICKVUE INFLUENZA TEST: NEGATIVE
SARS-COV-2, POC: NORMAL
VALID INTERNAL CONTROL, POC: YES
VALID INTERNAL CONTROL: YES
VENDOR AND KIT NAME POC: NORMAL

## 2024-11-18 PROCEDURE — 99213 OFFICE O/P EST LOW 20 MIN: CPT

## 2024-11-18 PROCEDURE — 87426 SARSCOV CORONAVIRUS AG IA: CPT

## 2024-11-18 PROCEDURE — 87804 INFLUENZA ASSAY W/OPTIC: CPT

## 2024-11-18 RX ORDER — IBUPROFEN 200 MG
200 TABLET ORAL EVERY 6 HOURS PRN
COMMUNITY

## 2024-11-18 SDOH — ECONOMIC STABILITY: FOOD INSECURITY: WITHIN THE PAST 12 MONTHS, THE FOOD YOU BOUGHT JUST DIDN'T LAST AND YOU DIDN'T HAVE MONEY TO GET MORE.: NEVER TRUE

## 2024-11-18 SDOH — ECONOMIC STABILITY: FOOD INSECURITY: WITHIN THE PAST 12 MONTHS, YOU WORRIED THAT YOUR FOOD WOULD RUN OUT BEFORE YOU GOT MONEY TO BUY MORE.: NEVER TRUE

## 2024-11-18 SDOH — ECONOMIC STABILITY: INCOME INSECURITY: HOW HARD IS IT FOR YOU TO PAY FOR THE VERY BASICS LIKE FOOD, HOUSING, MEDICAL CARE, AND HEATING?: NOT HARD AT ALL

## 2024-11-18 ASSESSMENT — ENCOUNTER SYMPTOMS
SORE THROAT: 1
CONSTIPATION: 0
RHINORRHEA: 0
WHEEZING: 0
DIARRHEA: 0
BLOOD IN STOOL: 0
NAUSEA: 0
ABDOMINAL PAIN: 0
SINUS PAIN: 0
SHORTNESS OF BREATH: 1
COUGH: 1
VOMITING: 0

## 2024-11-18 ASSESSMENT — PATIENT HEALTH QUESTIONNAIRE - PHQ9
1. LITTLE INTEREST OR PLEASURE IN DOING THINGS: NOT AT ALL
SUM OF ALL RESPONSES TO PHQ QUESTIONS 1-9: 0
SUM OF ALL RESPONSES TO PHQ QUESTIONS 1-9: 0
2. FEELING DOWN, DEPRESSED OR HOPELESS: NOT AT ALL
SUM OF ALL RESPONSES TO PHQ9 QUESTIONS 1 & 2: 0
SUM OF ALL RESPONSES TO PHQ QUESTIONS 1-9: 0
SUM OF ALL RESPONSES TO PHQ QUESTIONS 1-9: 0

## 2024-11-18 NOTE — PROGRESS NOTES
\"Have you been to the ER, urgent care clinic since your last visit?  Hospitalized since your last visit?\"    no    “Have you seen or consulted any other health care providers outside our system since your last visit?”    no     “Have you had a pap smear?”    yes    No cervical cancer screening on file

## 2024-11-18 NOTE — PROGRESS NOTES
[facility]   [address], [address]  [place] [address]  P: [phone]  F: [phone]    SUBJECTIVE     HPI:     Maite Scanlon is a 34 y.o. female who is seen in the clinic for   Chief Complaint   Patient presents with    Chest Congestion     Several days,coughing up mucous.    Pharyngitis        The patient presents to the office today c/o cough    Sore throat started 11/11 after spending time with her grandfather who was sick around 11/9-11/10. Body aches, fever (101F), chills started 11/14. On 11/15 she had a migraine, so she took sumatriptan 50 mg with improvement. Late 11/16 she developed a productive cough. She has mild SOB, but denies wheezing. Has been trying to take ibuprofen and tylenol. Also taking NyQuil.    Of note, she has seen cardiology in the past for tachycardia. Reports her heart rate is often elevated when she is sick.    Patient Active Problem List   Diagnosis    ADD (attention deficit disorder)        Past Medical History:   Diagnosis Date    Depression     Headache      Current Outpatient Medications   Medication Sig Dispense Refill    ibuprofen (ADVIL;MOTRIN) 200 MG tablet Take 1 tablet by mouth every 6 hours as needed for Pain      SUMAtriptan (IMITREX) 50 MG tablet Take 1 tab PO PRN migraine. If symptoms persists after 2 hours take 2nd tab. Do not exceed 2 tabs in 24 hours. 9 tablet 5    Ubrogepant (UBRELVY) 50 MG TABS Take 1 tab PO PRN migraine. If symptoms persists after 2 hours take 2nd tab. Do not exceed 2 tabs in 24 hours. 16 tablet 5    cetirizine (ZYRTEC) 10 MG tablet Take 1 tablet by mouth daily      Methylphenidate HCl ER, PM, (JORNAY PM) 60 MG CP24 TAKE TAKE 1 CAPSULE BY MOUTH EVERY DAY AT NIGHT      venlafaxine (EFFEXOR XR) 75 MG extended release capsule Take 2 capsules by mouth daily      fluticasone (FLONASE) 50 MCG/ACT nasal spray 1 spray by Each Nostril route daily (Patient not taking: Reported on 11/18/2024) 32 g 1     No current facility-administered

## 2024-11-19 ENCOUNTER — PATIENT MESSAGE (OUTPATIENT)
Dept: PRIMARY CARE CLINIC | Facility: CLINIC | Age: 34
End: 2024-11-19

## 2024-11-19 DIAGNOSIS — H10.32 ACUTE BACTERIAL CONJUNCTIVITIS OF LEFT EYE: Primary | ICD-10-CM

## 2024-11-19 RX ORDER — POLYMYXIN B SULFATE AND TRIMETHOPRIM 1; 10000 MG/ML; [USP'U]/ML
1 SOLUTION OPHTHALMIC EVERY 6 HOURS
Qty: 1 ML | Refills: 0 | Status: SHIPPED | OUTPATIENT
Start: 2024-11-19 | End: 2024-11-24

## 2025-02-06 ENCOUNTER — TELEPHONE (OUTPATIENT)
Dept: PRIMARY CARE CLINIC | Facility: CLINIC | Age: 35
End: 2025-02-06

## 2025-02-06 ENCOUNTER — OFFICE VISIT (OUTPATIENT)
Age: 35
End: 2025-02-06

## 2025-02-06 VITALS
DIASTOLIC BLOOD PRESSURE: 68 MMHG | TEMPERATURE: 98.4 F | HEART RATE: 136 BPM | OXYGEN SATURATION: 97 % | WEIGHT: 140.8 LBS | SYSTOLIC BLOOD PRESSURE: 98 MMHG | BODY MASS INDEX: 23.46 KG/M2 | HEIGHT: 65 IN

## 2025-02-06 DIAGNOSIS — R21 RASH: Primary | ICD-10-CM

## 2025-02-06 RX ORDER — DESOGESTREL AND ETHINYL ESTRADIOL 0.15-0.03
1 KIT ORAL DAILY
COMMUNITY
Start: 2024-12-09

## 2025-02-06 RX ORDER — TRIAMCINOLONE ACETONIDE 0.25 MG/G
OINTMENT TOPICAL
Qty: 15 G | Refills: 0 | Status: SHIPPED | OUTPATIENT
Start: 2025-02-06 | End: 2025-02-13

## 2025-02-06 NOTE — TELEPHONE ENCOUNTER
Called patient no answer left vm to call office back.     Unfortunately we do not have any available appointments today. We do recommend urgent care for evaluation.

## 2025-02-06 NOTE — PROGRESS NOTES
Maite Scanlon (:  1990) is a 34 y.o. female,New patient, here for evaluation of the following chief complaint(s):  Herpes Zoster (Suspects for shingles x 1 week,  itchy, red spots on abdomen, feels like sunburn on skin, headache, nausea, had chicken pox as a child, has been using hydrocortisone cream )      Assessment & Plan :  Visit Diagnoses and Associated Orders       ORDERS WITHOUT AN ASSOCIATED DIAGNOSIS    APRI 0.15-30 MG-MCG per tablet [36133]              Below is the assessment and plan developed based on review of history and  physical exam.   1. Rash  Patient appears well, VSS, afebrile, SPO2 97% on room air. No distress noted. Patient presents with rash of unknown etiology.  Patient advised of treatment plan as indicated below, but discussed if symptoms persist or worsen, they will need to follow up with their PCP or a dermatologist to evaluate further. Patient discharged with instructions to go to Emergency Department for sensation of throat closing, facial swelling, difficulty swallowing, difficulty breathing, shortness of breath, chest pain and uncontrolled fever above 100.4F. Patient advised if symptoms fail to improve or worsens to follow-up with PCP or ER. Patient verbalized understanding, no questions or concerns at this time.    - triamcinolone (KENALOG) 0.025 % ointment; Apply topically 2 times daily.  Dispense: 15 g; Refill: 0  - [facility] - Mela Dickey MD, Dermatology, [facility]     1. Handout given with care instructions.      2. OTC for symptom management. Increase fluid intake.     3. Follow-up with PCP as needed.     4. Go to ED with development of any acute symptoms.         Follow-up    Follow-up with PCP or ER immediately for any new worsening or changes or if symptoms are not improving over the next 5-7 days.   Subjective :  HPI     34 y.o. female presents with symptoms of shingles, c/o of itchy skin, states \"feels like she has sunburn\". Patient states her skin feels

## 2025-02-06 NOTE — PATIENT INSTRUCTIONS
If symptoms worsens or fail to improve follow-up with PCP or ER.    Thank you for visiting [facility] today.    Patient Instructions:  -Rest and drink plenty of fluids  -Zyrtec:  Take Zyrtec in the morning for a daytime antihistamine  -Triamcinolone:  Apply Triamcinolone topical steroid ointment twice a day to body (not face) for 7 days or until hives/itching resolves.  **Do not apply Triamcinolone to face as steroids can cause thinning of the skin**      If you begin to have shortness of breath or swelling in your mouth or throat, go to the ER.

## 2025-06-27 ENCOUNTER — OFFICE VISIT (OUTPATIENT)
Dept: PRIMARY CARE CLINIC | Facility: CLINIC | Age: 35
End: 2025-06-27
Payer: COMMERCIAL

## 2025-06-27 VITALS
BODY MASS INDEX: 24.26 KG/M2 | RESPIRATION RATE: 18 BRPM | WEIGHT: 145.6 LBS | OXYGEN SATURATION: 100 % | TEMPERATURE: 97.9 F | DIASTOLIC BLOOD PRESSURE: 82 MMHG | SYSTOLIC BLOOD PRESSURE: 131 MMHG | HEART RATE: 124 BPM | HEIGHT: 65 IN

## 2025-06-27 DIAGNOSIS — G43.709 CHRONIC MIGRAINE WITHOUT AURA WITHOUT STATUS MIGRAINOSUS, NOT INTRACTABLE: ICD-10-CM

## 2025-06-27 DIAGNOSIS — G43.709 CHRONIC MIGRAINE WITHOUT AURA WITHOUT STATUS MIGRAINOSUS, NOT INTRACTABLE: Primary | ICD-10-CM

## 2025-06-27 PROCEDURE — 99214 OFFICE O/P EST MOD 30 MIN: CPT | Performed by: FAMILY MEDICINE

## 2025-06-27 RX ORDER — ONDANSETRON 4 MG/1
4 TABLET, ORALLY DISINTEGRATING ORAL EVERY 12 HOURS PRN
Qty: 21 TABLET | Refills: 2 | Status: SHIPPED | OUTPATIENT
Start: 2025-06-27

## 2025-06-27 RX ORDER — RIMEGEPANT SULFATE 75 MG/75MG
TABLET, ORALLY DISINTEGRATING ORAL
Qty: 16 TABLET | Refills: 5 | Status: SHIPPED | OUTPATIENT
Start: 2025-06-27 | End: 2025-06-27 | Stop reason: SDUPTHER

## 2025-06-27 RX ORDER — RIMEGEPANT SULFATE 75 MG/75MG
TABLET, ORALLY DISINTEGRATING ORAL
Qty: 16 TABLET | Refills: 5 | Status: SHIPPED | OUTPATIENT
Start: 2025-06-27

## 2025-06-27 SDOH — ECONOMIC STABILITY: FOOD INSECURITY: WITHIN THE PAST 12 MONTHS, THE FOOD YOU BOUGHT JUST DIDN'T LAST AND YOU DIDN'T HAVE MONEY TO GET MORE.: NEVER TRUE

## 2025-06-27 SDOH — ECONOMIC STABILITY: FOOD INSECURITY: WITHIN THE PAST 12 MONTHS, YOU WORRIED THAT YOUR FOOD WOULD RUN OUT BEFORE YOU GOT MONEY TO BUY MORE.: NEVER TRUE

## 2025-06-27 ASSESSMENT — PATIENT HEALTH QUESTIONNAIRE - PHQ9
8. MOVING OR SPEAKING SO SLOWLY THAT OTHER PEOPLE COULD HAVE NOTICED. OR THE OPPOSITE, BEING SO FIGETY OR RESTLESS THAT YOU HAVE BEEN MOVING AROUND A LOT MORE THAN USUAL: NOT AT ALL
SUM OF ALL RESPONSES TO PHQ QUESTIONS 1-9: 0
3. TROUBLE FALLING OR STAYING ASLEEP: NOT AT ALL
10. IF YOU CHECKED OFF ANY PROBLEMS, HOW DIFFICULT HAVE THESE PROBLEMS MADE IT FOR YOU TO DO YOUR WORK, TAKE CARE OF THINGS AT HOME, OR GET ALONG WITH OTHER PEOPLE: NOT DIFFICULT AT ALL
6. FEELING BAD ABOUT YOURSELF - OR THAT YOU ARE A FAILURE OR HAVE LET YOURSELF OR YOUR FAMILY DOWN: NOT AT ALL
7. TROUBLE CONCENTRATING ON THINGS, SUCH AS READING THE NEWSPAPER OR WATCHING TELEVISION: NOT AT ALL
SUM OF ALL RESPONSES TO PHQ QUESTIONS 1-9: 0
SUM OF ALL RESPONSES TO PHQ QUESTIONS 1-9: 0
1. LITTLE INTEREST OR PLEASURE IN DOING THINGS: NOT AT ALL
9. THOUGHTS THAT YOU WOULD BE BETTER OFF DEAD, OR OF HURTING YOURSELF: NOT AT ALL
SUM OF ALL RESPONSES TO PHQ QUESTIONS 1-9: 0
4. FEELING TIRED OR HAVING LITTLE ENERGY: NOT AT ALL
2. FEELING DOWN, DEPRESSED OR HOPELESS: NOT AT ALL
5. POOR APPETITE OR OVEREATING: NOT AT ALL

## 2025-06-27 NOTE — PROGRESS NOTES
Health Decision Maker has been checked with the patient      AI form was signed    Chief Complaint   Patient presents with    Migraine     Follow up on migraines/medications       \"Have you been to the ER, urgent care clinic since your last visit?  Hospitalized since your last visit?\"    NO    “Have you seen or consulted any other health care providers outside of [facility] since your last visit?”    NO      Vitals:    06/27/25 1048   Temp: 97.9 °F (36.6 °C)   TempSrc: Oral   Weight: 66 kg (145 lb 9.6 oz)   Height: 1.651 m (5' 5\")      Depression: Not at risk (11/18/2024)    PHQ-2     PHQ-2 Score: 0       “Have you had a pap smear?”    YES - Where: [facility] Nurse/CMA to request most recent records if not in the chart    No cervical cancer screening on file             Click Here for Release of Records Request    Chart reviewed: immunizations are documented.   Immunization History   Administered Date(s) Administered    COVID-19, J&J, (age 18y+), IM, 0.5 mL 04/12/2021    Influenza Virus Vaccine 09/20/2019, 09/07/2021, 09/01/2024    Influenza, FLUCELVAX, (age 6 mo+), MDCK, Quadv PF, 0.5mL 09/14/2023

## 2025-06-27 NOTE — PROGRESS NOTES
HPI     Chief Complaint   Patient presents with    Migraine     Follow up on migraines/medications       History of Present Illness  The patient presents for evaluation of migraines.    She reports cyclical migraines with severe episodes followed by months of calm. Frequency has increased to 4-5 times/month, primarily left-sided but occasionally right-sided, with light sensitivity, pressure, nausea, and vomiting. No recent auras. Previous neurologist consultations in the past Believes stress and pollen may trigger migraines.    Prescribed sumatriptan and Ubrelvy; Ubrelvy ineffective. Sumatriptan not consistently effective, cautious about overuse. Recent migraine temporarily alleviated by sumatriptan, headache returned next day. Nurtec beneficial last night. She is currently using samples. Dietary changes ineffective. Considering Cefaly TENS device. Requests Zofran refill, finds it helpful. Muscle pain in face after sumatriptan, lasting about a day. Sumatriptan causes sleepiness, takes 1.5-2 hours to take effect.    Seen cardiologist for tachycardia. Urgent care referral to cardiologist due to high heart rate. Echocardiogram, Holter monitor, heart ultrasound, and blood work normal.       Reviewed PmHx, RxHx, FmHx, SocHx, AllgHx and updated and dated in the chart.    Physical Exam:  /82   Pulse (!) 124   Temp 97.9 °F (36.6 °C) (Oral)   Resp 18   Ht 1.651 m (5' 5\")   Wt 66 kg (145 lb 9.6 oz)   SpO2 100%   BMI 24.23 kg/m²     Physical Exam  WNWD NAD  Regular rhythm, tachy, no murmur  CTA no wheezes ronchi rales  CN II-XII intact. Strength 5/5 in lower ext BL. No gross sensation abnormalities in upper or lower ext BL. Patellar reflexes 2+ BL. Finger to nose normal BL. EOM intact. AMBER BL.  Answers questions appropriately and follows commands. Gait is normal.   Normal mood/ affect       Results          No results found for this or any previous visit (from the past 12 hours).}        Assessment / Plan

## 2025-06-30 ENCOUNTER — TELEPHONE (OUTPATIENT)
Dept: PRIMARY CARE CLINIC | Facility: CLINIC | Age: 35
End: 2025-06-30

## 2025-07-18 ENCOUNTER — HOSPITAL ENCOUNTER (OUTPATIENT)
Facility: HOSPITAL | Age: 35
Discharge: HOME | End: 2025-07-21
Payer: COMMERCIAL

## 2025-07-18 DIAGNOSIS — G43.709 CHRONIC MIGRAINE WITHOUT AURA WITHOUT STATUS MIGRAINOSUS, NOT INTRACTABLE: ICD-10-CM

## 2025-07-18 PROCEDURE — 70551 MRI BRAIN STEM W/O DYE: CPT

## 2025-07-21 ENCOUNTER — RESULTS FOLLOW-UP (OUTPATIENT)
Dept: PRIMARY CARE CLINIC | Facility: CLINIC | Age: 35
End: 2025-07-21